# Patient Record
Sex: MALE | Race: WHITE | Employment: OTHER | URBAN - METROPOLITAN AREA
[De-identification: names, ages, dates, MRNs, and addresses within clinical notes are randomized per-mention and may not be internally consistent; named-entity substitution may affect disease eponyms.]

---

## 2017-01-01 ENCOUNTER — HOSPITAL ENCOUNTER (EMERGENCY)
Age: 77
Discharge: HOME | End: 2017-10-10
Attending: EMERGENCY MEDICINE
Payer: MEDICARE

## 2017-01-01 ENCOUNTER — TELEPHONE (OUTPATIENT)
Dept: ONCOLOGY | Age: 77
End: 2017-01-01

## 2017-01-01 ENCOUNTER — OFFICE VISIT (OUTPATIENT)
Dept: FAMILY MEDICINE CLINIC | Age: 77
End: 2017-01-01

## 2017-01-01 ENCOUNTER — HOSPITAL ENCOUNTER (OUTPATIENT)
Dept: CT IMAGING | Age: 77
Discharge: HOME | End: 2017-10-05
Attending: INTERNAL MEDICINE
Payer: MEDICARE

## 2017-01-01 ENCOUNTER — OFFICE VISIT (OUTPATIENT)
Dept: ONCOLOGY | Age: 77
End: 2017-01-01

## 2017-01-01 ENCOUNTER — HOSPITAL ENCOUNTER (OUTPATIENT)
Dept: CT IMAGING | Age: 77
Discharge: HOME | End: 2017-10-17
Attending: INTERNAL MEDICINE
Payer: MEDICARE

## 2017-01-01 ENCOUNTER — HOSPITAL ENCOUNTER (OUTPATIENT)
Age: 77
Setting detail: OUTPATIENT SURGERY
Discharge: HOME | End: 2017-10-27
Attending: INTERNAL MEDICINE | Admitting: INTERNAL MEDICINE
Payer: MEDICARE

## 2017-01-01 ENCOUNTER — TELEPHONE ANTICOAG (OUTPATIENT)
Dept: FAMILY MEDICINE CLINIC | Age: 77
End: 2017-01-01

## 2017-01-01 ENCOUNTER — HOSPITAL ENCOUNTER (OUTPATIENT)
Dept: ULTRASOUND IMAGING | Age: 77
Discharge: HOME | End: 2017-10-05
Attending: RADIOLOGY
Payer: MEDICARE

## 2017-01-01 ENCOUNTER — TELEPHONE (OUTPATIENT)
Dept: FAMILY MEDICINE CLINIC | Age: 77
End: 2017-01-01

## 2017-01-01 ENCOUNTER — ANESTHESIA EVENT (OUTPATIENT)
Dept: ENDOSCOPY | Age: 77
End: 2017-01-01
Payer: MEDICARE

## 2017-01-01 ENCOUNTER — APPOINTMENT (OUTPATIENT)
Dept: GENERAL RADIOLOGY | Age: 77
End: 2017-01-01
Attending: EMERGENCY MEDICINE
Payer: MEDICARE

## 2017-01-01 ENCOUNTER — APPOINTMENT (OUTPATIENT)
Dept: CT IMAGING | Age: 77
End: 2017-01-01
Attending: EMERGENCY MEDICINE
Payer: MEDICARE

## 2017-01-01 ENCOUNTER — HOSPITAL ENCOUNTER (OUTPATIENT)
Dept: MRI IMAGING | Age: 77
Discharge: HOME | End: 2017-10-20
Attending: INTERNAL MEDICINE
Payer: MEDICARE

## 2017-01-01 ENCOUNTER — ANESTHESIA (OUTPATIENT)
Dept: ENDOSCOPY | Age: 77
End: 2017-01-01
Payer: MEDICARE

## 2017-01-01 VITALS
WEIGHT: 168 LBS | HEART RATE: 112 BPM | BODY MASS INDEX: 24.05 KG/M2 | DIASTOLIC BLOOD PRESSURE: 64 MMHG | SYSTOLIC BLOOD PRESSURE: 110 MMHG | RESPIRATION RATE: 20 BRPM | OXYGEN SATURATION: 97 % | HEIGHT: 70 IN

## 2017-01-01 VITALS
WEIGHT: 164.56 LBS | TEMPERATURE: 98.1 F | SYSTOLIC BLOOD PRESSURE: 111 MMHG | HEIGHT: 70 IN | DIASTOLIC BLOOD PRESSURE: 85 MMHG | RESPIRATION RATE: 20 BRPM | BODY MASS INDEX: 23.56 KG/M2 | OXYGEN SATURATION: 98 % | HEART RATE: 91 BPM

## 2017-01-01 VITALS
SYSTOLIC BLOOD PRESSURE: 132 MMHG | OXYGEN SATURATION: 96 % | BODY MASS INDEX: 25.97 KG/M2 | HEART RATE: 74 BPM | RESPIRATION RATE: 16 BRPM | WEIGHT: 181 LBS | DIASTOLIC BLOOD PRESSURE: 84 MMHG

## 2017-01-01 VITALS
DIASTOLIC BLOOD PRESSURE: 90 MMHG | RESPIRATION RATE: 16 BRPM | OXYGEN SATURATION: 97 % | HEART RATE: 54 BPM | SYSTOLIC BLOOD PRESSURE: 154 MMHG | WEIGHT: 183 LBS | BODY MASS INDEX: 26.26 KG/M2

## 2017-01-01 VITALS
SYSTOLIC BLOOD PRESSURE: 168 MMHG | RESPIRATION RATE: 16 BRPM | BODY MASS INDEX: 24.88 KG/M2 | TEMPERATURE: 97.2 F | DIASTOLIC BLOOD PRESSURE: 84 MMHG | HEIGHT: 70 IN | OXYGEN SATURATION: 98 % | HEART RATE: 92 BPM | WEIGHT: 173.8 LBS

## 2017-01-01 VITALS
SYSTOLIC BLOOD PRESSURE: 180 MMHG | DIASTOLIC BLOOD PRESSURE: 97 MMHG | HEART RATE: 108 BPM | TEMPERATURE: 97.3 F | WEIGHT: 171.74 LBS | HEIGHT: 70 IN | RESPIRATION RATE: 25 BRPM | BODY MASS INDEX: 24.59 KG/M2 | OXYGEN SATURATION: 94 %

## 2017-01-01 VITALS
TEMPERATURE: 96 F | OXYGEN SATURATION: 98 % | DIASTOLIC BLOOD PRESSURE: 90 MMHG | RESPIRATION RATE: 17 BRPM | WEIGHT: 179.8 LBS | BODY MASS INDEX: 25.8 KG/M2 | SYSTOLIC BLOOD PRESSURE: 142 MMHG | HEART RATE: 65 BPM

## 2017-01-01 VITALS
DIASTOLIC BLOOD PRESSURE: 82 MMHG | RESPIRATION RATE: 19 BRPM | WEIGHT: 179.4 LBS | OXYGEN SATURATION: 97 % | BODY MASS INDEX: 25.74 KG/M2 | SYSTOLIC BLOOD PRESSURE: 125 MMHG | HEART RATE: 67 BPM

## 2017-01-01 VITALS
HEIGHT: 70 IN | RESPIRATION RATE: 19 BRPM | WEIGHT: 183.6 LBS | OXYGEN SATURATION: 98 % | DIASTOLIC BLOOD PRESSURE: 70 MMHG | SYSTOLIC BLOOD PRESSURE: 118 MMHG | BODY MASS INDEX: 26.28 KG/M2 | HEART RATE: 79 BPM

## 2017-01-01 VITALS
OXYGEN SATURATION: 97 % | DIASTOLIC BLOOD PRESSURE: 90 MMHG | HEART RATE: 93 BPM | RESPIRATION RATE: 16 BRPM | SYSTOLIC BLOOD PRESSURE: 138 MMHG | TEMPERATURE: 98.1 F | WEIGHT: 175 LBS | HEIGHT: 70 IN | BODY MASS INDEX: 25.05 KG/M2

## 2017-01-01 VITALS
HEART RATE: 110 BPM | OXYGEN SATURATION: 95 % | SYSTOLIC BLOOD PRESSURE: 94 MMHG | RESPIRATION RATE: 16 BRPM | BODY MASS INDEX: 24.34 KG/M2 | HEIGHT: 70 IN | WEIGHT: 170 LBS | DIASTOLIC BLOOD PRESSURE: 60 MMHG

## 2017-01-01 VITALS
BODY MASS INDEX: 26.14 KG/M2 | OXYGEN SATURATION: 98 % | HEART RATE: 87 BPM | RESPIRATION RATE: 19 BRPM | WEIGHT: 182.2 LBS

## 2017-01-01 DIAGNOSIS — C78.7 LIVER METASTASIS (HCC): ICD-10-CM

## 2017-01-01 DIAGNOSIS — C78.6 METASTATIC ADENOCARCINOMA INVOLVING RETROPERITONEUM WITH UNKNOWN PRIMARY SITE (HCC): ICD-10-CM

## 2017-01-01 DIAGNOSIS — I45.10 RBBB (RIGHT BUNDLE BRANCH BLOCK): ICD-10-CM

## 2017-01-01 DIAGNOSIS — K44.9 HIATAL HERNIA: ICD-10-CM

## 2017-01-01 DIAGNOSIS — L01.00 IMPETIGO: ICD-10-CM

## 2017-01-01 DIAGNOSIS — C80.1 METASTATIC ADENOCARCINOMA INVOLVING RETROPERITONEUM WITH UNKNOWN PRIMARY SITE (HCC): Primary | ICD-10-CM

## 2017-01-01 DIAGNOSIS — Z71.89 ADVANCE CARE PLANNING: ICD-10-CM

## 2017-01-01 DIAGNOSIS — R10.84 ABDOMINAL PAIN, GENERALIZED: Primary | ICD-10-CM

## 2017-01-01 DIAGNOSIS — K76.9 LIVER LESION: ICD-10-CM

## 2017-01-01 DIAGNOSIS — R10.11 RUQ PAIN: ICD-10-CM

## 2017-01-01 DIAGNOSIS — G44.209 TENSION HEADACHE: ICD-10-CM

## 2017-01-01 DIAGNOSIS — R52 PAIN: ICD-10-CM

## 2017-01-01 DIAGNOSIS — I48.20 CHRONIC ATRIAL FIBRILLATION (HCC): Primary | ICD-10-CM

## 2017-01-01 DIAGNOSIS — M25.552 LEFT HIP PAIN: ICD-10-CM

## 2017-01-01 DIAGNOSIS — R16.0 LIVER MASS: ICD-10-CM

## 2017-01-01 DIAGNOSIS — S81.801A LEG WOUND, RIGHT, INITIAL ENCOUNTER: ICD-10-CM

## 2017-01-01 DIAGNOSIS — C15.5 MALIGNANT NEOPLASM OF LOWER THIRD OF ESOPHAGUS (HCC): ICD-10-CM

## 2017-01-01 DIAGNOSIS — K62.1 RECTAL POLYP: ICD-10-CM

## 2017-01-01 DIAGNOSIS — C78.7 METASTASIS TO LIVER (HCC): Primary | ICD-10-CM

## 2017-01-01 DIAGNOSIS — C80.1 CARCINOMA (HCC): ICD-10-CM

## 2017-01-01 DIAGNOSIS — I10 ESSENTIAL HYPERTENSION: ICD-10-CM

## 2017-01-01 DIAGNOSIS — I48.20 CHRONIC ATRIAL FIBRILLATION (HCC): ICD-10-CM

## 2017-01-01 DIAGNOSIS — C77.9 REGIONAL LYMPH NODE METASTASIS PRESENT (HCC): ICD-10-CM

## 2017-01-01 DIAGNOSIS — C78.7 LIVER METASTASES (HCC): ICD-10-CM

## 2017-01-01 DIAGNOSIS — R21 RASH: ICD-10-CM

## 2017-01-01 DIAGNOSIS — C80.1 METASTATIC ADENOCARCINOMA INVOLVING RETROPERITONEUM WITH UNKNOWN PRIMARY SITE (HCC): ICD-10-CM

## 2017-01-01 DIAGNOSIS — R93.5 ABNORMAL US (ULTRASOUND) OF ABDOMEN: ICD-10-CM

## 2017-01-01 DIAGNOSIS — S81.801A OPEN WOUND OF LEG, RIGHT, INITIAL ENCOUNTER: ICD-10-CM

## 2017-01-01 DIAGNOSIS — I87.2 VENOUS STASIS DERMATITIS OF RIGHT LOWER EXTREMITY: Primary | ICD-10-CM

## 2017-01-01 DIAGNOSIS — C78.7 LIVER METASTASIS (HCC): Primary | ICD-10-CM

## 2017-01-01 DIAGNOSIS — Z00.00 MEDICARE ANNUAL WELLNESS VISIT, SUBSEQUENT: Primary | ICD-10-CM

## 2017-01-01 DIAGNOSIS — Z71.89 ADVANCED CARE PLANNING/COUNSELING DISCUSSION: ICD-10-CM

## 2017-01-01 DIAGNOSIS — C78.6 METASTATIC ADENOCARCINOMA INVOLVING RETROPERITONEUM WITH UNKNOWN PRIMARY SITE (HCC): Primary | ICD-10-CM

## 2017-01-01 LAB
ALBUMIN SERPL-MCNC: 3.1 G/DL (ref 3.5–5)
ALBUMIN SERPL-MCNC: 4.1 G/DL (ref 3.5–4.8)
ALBUMIN/GLOB SERPL: 0.7 {RATIO} (ref 1.1–2.2)
ALBUMIN/GLOB SERPL: 1.6 {RATIO} (ref 1.2–2.2)
ALP SERPL-CCNC: 151 IU/L (ref 39–117)
ALP SERPL-CCNC: 262 U/L (ref 45–117)
ALT SERPL-CCNC: 168 U/L (ref 12–78)
ALT SERPL-CCNC: 85 IU/L (ref 0–44)
ANION GAP SERPL CALC-SCNC: 10 MMOL/L (ref 5–15)
APPEARANCE UR: ABNORMAL
AST SERPL-CCNC: 160 U/L (ref 15–37)
AST SERPL-CCNC: 75 IU/L (ref 0–40)
ATRIAL RATE: 100 BPM
BACTERIA SPEC AEROBE CULT: ABNORMAL
BACTERIA URNS QL MICRO: NEGATIVE /HPF
BASOPHILS # BLD AUTO: 0 X10E3/UL (ref 0–0.2)
BASOPHILS # BLD: 0 K/UL (ref 0–0.1)
BASOPHILS NFR BLD AUTO: 1 %
BASOPHILS NFR BLD: 0 % (ref 0–1)
BILIRUB SERPL-MCNC: 1.2 MG/DL (ref 0–1.2)
BILIRUB SERPL-MCNC: 2 MG/DL (ref 0.2–1)
BILIRUB UR QL CFM: NEGATIVE
BUN SERPL-MCNC: 14 MG/DL (ref 8–27)
BUN SERPL-MCNC: 15 MG/DL (ref 6–20)
BUN/CREAT SERPL: 14 (ref 10–24)
BUN/CREAT SERPL: 17 (ref 12–20)
CALCIUM SERPL-MCNC: 10 MG/DL (ref 8.6–10.2)
CALCIUM SERPL-MCNC: 9.5 MG/DL (ref 8.5–10.1)
CALCULATED R AXIS, ECG10: 74 DEGREES
CALCULATED T AXIS, ECG11: -3 DEGREES
CHLORIDE SERPL-SCNC: 101 MMOL/L (ref 97–108)
CHLORIDE SERPL-SCNC: 98 MMOL/L (ref 96–106)
CHOLEST SERPL-MCNC: 207 MG/DL (ref 100–199)
CK SERPL-CCNC: 258 U/L (ref 39–308)
CO2 SERPL-SCNC: 23 MMOL/L (ref 21–32)
CO2 SERPL-SCNC: 27 MMOL/L (ref 18–29)
COLOR UR: ABNORMAL
CREAT SERPL-MCNC: 0.88 MG/DL (ref 0.7–1.3)
CREAT SERPL-MCNC: 1 MG/DL (ref 0.76–1.27)
DIAGNOSIS, 93000: NORMAL
EOSINOPHIL # BLD AUTO: 0.1 X10E3/UL (ref 0–0.4)
EOSINOPHIL # BLD: 0.1 K/UL (ref 0–0.4)
EOSINOPHIL NFR BLD AUTO: 1 %
EOSINOPHIL NFR BLD: 2 % (ref 0–7)
EPITH CASTS URNS QL MICRO: ABNORMAL /LPF
ERYTHROCYTE [DISTWIDTH] IN BLOOD BY AUTOMATED COUNT: 14.6 % (ref 11.5–14.5)
ERYTHROCYTE [DISTWIDTH] IN BLOOD BY AUTOMATED COUNT: 14.9 % (ref 12.3–15.4)
GLOBULIN SER CALC-MCNC: 2.5 G/DL (ref 1.5–4.5)
GLOBULIN SER CALC-MCNC: 4.2 G/DL (ref 2–4)
GLUCOSE SERPL-MCNC: 102 MG/DL (ref 65–100)
GLUCOSE SERPL-MCNC: 156 MG/DL (ref 65–99)
GLUCOSE UR STRIP.AUTO-MCNC: NEGATIVE MG/DL
HCT VFR BLD AUTO: 45.3 % (ref 36.6–50.3)
HCT VFR BLD AUTO: 47.7 % (ref 37.5–51)
HDLC SERPL-MCNC: 71 MG/DL
HGB BLD-MCNC: 15.8 G/DL (ref 12.1–17)
HGB BLD-MCNC: 15.9 G/DL (ref 12.6–17.7)
HGB UR QL STRIP: NEGATIVE
IMM GRANULOCYTES # BLD: 0 X10E3/UL (ref 0–0.1)
IMM GRANULOCYTES NFR BLD: 1 %
INR BLD: 1 (ref 0.9–1.2)
INR BLD: 2.1
INR BLD: 2.2
INR BLD: 2.2
INR BLD: 2.5
INR BLD: 3.2
INR BLD: 3.5
INR PPP: 1.3 (ref 0.9–1.1)
INR PPP: 1.9 (ref 0.8–1.2)
INR PPP: 2.2 (ref 0.8–1.2)
INR PPP: 2.7 (ref 0.8–1.2)
INR PPP: 3.6 (ref 0.8–1.2)
INR PPP: 4.5 (ref 0.8–1.2)
INR, EXTERNAL: 1.9
INR, EXTERNAL: 2.2
KETONES UR QL STRIP.AUTO: ABNORMAL MG/DL
LACTATE SERPL-SCNC: 1.5 MMOL/L (ref 0.4–2)
LDLC SERPL CALC-MCNC: 118 MG/DL (ref 0–99)
LEUKOCYTE ESTERASE UR QL STRIP.AUTO: NEGATIVE
LIPASE SERPL-CCNC: 221 U/L (ref 73–393)
LYMPHOCYTES # BLD AUTO: 1.3 X10E3/UL (ref 0.7–3.1)
LYMPHOCYTES # BLD: 1.2 K/UL (ref 0.8–3.5)
LYMPHOCYTES NFR BLD AUTO: 20 %
LYMPHOCYTES NFR BLD: 15 % (ref 12–49)
MAGNESIUM SERPL-MCNC: 2 MG/DL (ref 1.6–2.4)
MCH RBC QN AUTO: 31.9 PG (ref 26.6–33)
MCH RBC QN AUTO: 32.2 PG (ref 26–34)
MCHC RBC AUTO-ENTMCNC: 33.3 G/DL (ref 31.5–35.7)
MCHC RBC AUTO-ENTMCNC: 34.9 G/DL (ref 30–36.5)
MCV RBC AUTO: 92.4 FL (ref 80–99)
MCV RBC AUTO: 96 FL (ref 79–97)
MONOCYTES # BLD AUTO: 0.7 X10E3/UL (ref 0.1–0.9)
MONOCYTES # BLD: 1.2 K/UL (ref 0–1)
MONOCYTES NFR BLD AUTO: 11 %
MONOCYTES NFR BLD: 14 % (ref 5–13)
NEUTROPHILS # BLD AUTO: 4.3 X10E3/UL (ref 1.4–7)
NEUTROPHILS NFR BLD AUTO: 66 %
NEUTS SEG # BLD: 5.7 K/UL (ref 1.8–8)
NEUTS SEG NFR BLD: 69 % (ref 32–75)
NITRITE UR QL STRIP.AUTO: NEGATIVE
PH UR STRIP: 5.5 [PH] (ref 5–8)
PLATELET # BLD AUTO: 190 X10E3/UL (ref 150–379)
PLATELET # BLD AUTO: 201 K/UL (ref 150–400)
POTASSIUM SERPL-SCNC: 3.9 MMOL/L (ref 3.5–5.1)
POTASSIUM SERPL-SCNC: 4.6 MMOL/L (ref 3.5–5.2)
PROT SERPL-MCNC: 6.6 G/DL (ref 6–8.5)
PROT SERPL-MCNC: 7.3 G/DL (ref 6.4–8.2)
PROT UR STRIP-MCNC: ABNORMAL MG/DL
PROTHROMBIN TIME: 13 SEC (ref 9–11.1)
PROTHROMBIN TIME: 19.4 SEC (ref 9.1–12)
PROTHROMBIN TIME: 22.2 SEC (ref 9.1–12)
PROTHROMBIN TIME: 27.4 SEC (ref 9.1–12)
PROTHROMBIN TIME: 36 SEC (ref 9.1–12)
PROTHROMBIN TIME: 44.2 SEC (ref 9.1–12)
PT POC: 25.2 SECONDS
PT POC: 26.2 SECONDS
PT POC: 26.4 SECONDS
PT POC: 30.5 SECONDS
PT POC: 38.9 SEC
PT POC: 42.1 SECONDS
Q-T INTERVAL, ECG07: 314 MS
QRS DURATION, ECG06: 134 MS
QTC CALCULATION (BEZET), ECG08: 407 MS
RBC # BLD AUTO: 4.9 M/UL (ref 4.1–5.7)
RBC # BLD AUTO: 4.99 X10E6/UL (ref 4.14–5.8)
RBC #/AREA URNS HPF: ABNORMAL /HPF (ref 0–5)
SODIUM SERPL-SCNC: 134 MMOL/L (ref 136–145)
SODIUM SERPL-SCNC: 139 MMOL/L (ref 134–144)
SP GR UR REFRACTOMETRY: 1.02 (ref 1–1.03)
TRIGL SERPL-MCNC: 92 MG/DL (ref 0–149)
TROPONIN I SERPL-MCNC: <0.04 NG/ML
UROBILINOGEN UR QL STRIP.AUTO: 1 EU/DL (ref 0.2–1)
VALID INTERNAL CONTROL?: YES
VENTRICULAR RATE, ECG03: 101 BPM
VLDLC SERPL CALC-MCNC: 18 MG/DL (ref 5–40)
WBC # BLD AUTO: 6.4 X10E3/UL (ref 3.4–10.8)
WBC # BLD AUTO: 8.3 K/UL (ref 4.1–11.1)
WBC URNS QL MICRO: ABNORMAL /HPF (ref 0–4)

## 2017-01-01 PROCEDURE — 74011250636 HC RX REV CODE- 250/636: Performed by: RADIOLOGY

## 2017-01-01 PROCEDURE — 77030009426 HC FCPS BIOP ENDOSC BSC -B: Performed by: INTERNAL MEDICINE

## 2017-01-01 PROCEDURE — A9577 INJ MULTIHANCE: HCPCS | Performed by: INTERNAL MEDICINE

## 2017-01-01 PROCEDURE — 83690 ASSAY OF LIPASE: CPT | Performed by: EMERGENCY MEDICINE

## 2017-01-01 PROCEDURE — 83605 ASSAY OF LACTIC ACID: CPT | Performed by: EMERGENCY MEDICINE

## 2017-01-01 PROCEDURE — 83735 ASSAY OF MAGNESIUM: CPT | Performed by: EMERGENCY MEDICINE

## 2017-01-01 PROCEDURE — 88173 CYTOPATH EVAL FNA REPORT: CPT | Performed by: RADIOLOGY

## 2017-01-01 PROCEDURE — 76060000031 HC ANESTHESIA FIRST 0.5 HR: Performed by: INTERNAL MEDICINE

## 2017-01-01 PROCEDURE — 96374 THER/PROPH/DIAG INJ IV PUSH: CPT

## 2017-01-01 PROCEDURE — 88305 TISSUE EXAM BY PATHOLOGIST: CPT | Performed by: INTERNAL MEDICINE

## 2017-01-01 PROCEDURE — 84484 ASSAY OF TROPONIN QUANT: CPT | Performed by: EMERGENCY MEDICINE

## 2017-01-01 PROCEDURE — 74011636320 HC RX REV CODE- 636/320: Performed by: EMERGENCY MEDICINE

## 2017-01-01 PROCEDURE — 36415 COLL VENOUS BLD VENIPUNCTURE: CPT | Performed by: EMERGENCY MEDICINE

## 2017-01-01 PROCEDURE — 85610 PROTHROMBIN TIME: CPT

## 2017-01-01 PROCEDURE — 74011000250 HC RX REV CODE- 250

## 2017-01-01 PROCEDURE — 80053 COMPREHEN METABOLIC PANEL: CPT | Performed by: EMERGENCY MEDICINE

## 2017-01-01 PROCEDURE — 74011250636 HC RX REV CODE- 250/636: Performed by: EMERGENCY MEDICINE

## 2017-01-01 PROCEDURE — 85610 PROTHROMBIN TIME: CPT | Performed by: EMERGENCY MEDICINE

## 2017-01-01 PROCEDURE — 96361 HYDRATE IV INFUSION ADD-ON: CPT

## 2017-01-01 PROCEDURE — 81001 URINALYSIS AUTO W/SCOPE: CPT | Performed by: EMERGENCY MEDICINE

## 2017-01-01 PROCEDURE — 76040000019: Performed by: INTERNAL MEDICINE

## 2017-01-01 PROCEDURE — 88342 IMHCHEM/IMCYTCHM 1ST ANTB: CPT | Performed by: RADIOLOGY

## 2017-01-01 PROCEDURE — 74011250637 HC RX REV CODE- 250/637: Performed by: EMERGENCY MEDICINE

## 2017-01-01 PROCEDURE — 71260 CT THORAX DX C+: CPT

## 2017-01-01 PROCEDURE — 74177 CT ABD & PELVIS W/CONTRAST: CPT

## 2017-01-01 PROCEDURE — 85025 COMPLETE CBC W/AUTO DIFF WBC: CPT | Performed by: EMERGENCY MEDICINE

## 2017-01-01 PROCEDURE — 74011250636 HC RX REV CODE- 250/636

## 2017-01-01 PROCEDURE — 71010 XR CHEST PORT: CPT

## 2017-01-01 PROCEDURE — 82550 ASSAY OF CK (CPK): CPT | Performed by: EMERGENCY MEDICINE

## 2017-01-01 PROCEDURE — 74183 MRI ABD W/O CNTR FLWD CNTR: CPT

## 2017-01-01 PROCEDURE — 74011250636 HC RX REV CODE- 250/636: Performed by: INTERNAL MEDICINE

## 2017-01-01 PROCEDURE — 74011000250 HC RX REV CODE- 250: Performed by: RADIOLOGY

## 2017-01-01 PROCEDURE — 99285 EMERGENCY DEPT VISIT HI MDM: CPT

## 2017-01-01 PROCEDURE — 77030003503 HC NDL BIOP TISS BD -B

## 2017-01-01 PROCEDURE — 47000 NEEDLE BIOPSY OF LIVER PERQ: CPT

## 2017-01-01 PROCEDURE — 74011000258 HC RX REV CODE- 258: Performed by: INTERNAL MEDICINE

## 2017-01-01 PROCEDURE — 93005 ELECTROCARDIOGRAM TRACING: CPT

## 2017-01-01 PROCEDURE — 88365 INSITU HYBRIDIZATION (FISH): CPT | Performed by: RADIOLOGY

## 2017-01-01 PROCEDURE — 74011636320 HC RX REV CODE- 636/320: Performed by: INTERNAL MEDICINE

## 2017-01-01 PROCEDURE — 88341 IMHCHEM/IMCYTCHM EA ADD ANTB: CPT | Performed by: RADIOLOGY

## 2017-01-01 PROCEDURE — 88307 TISSUE EXAM BY PATHOLOGIST: CPT | Performed by: RADIOLOGY

## 2017-01-01 PROCEDURE — 88172 CYTP DX EVAL FNA 1ST EA SITE: CPT | Performed by: RADIOLOGY

## 2017-01-01 RX ORDER — POLYETHYLENE GLYCOL 3350 17 G/17G
17 POWDER, FOR SOLUTION ORAL DAILY
Qty: 30 PACKET | Refills: 3 | Status: SHIPPED | OUTPATIENT
Start: 2017-01-01

## 2017-01-01 RX ORDER — EPINEPHRINE 0.1 MG/ML
1 INJECTION INTRACARDIAC; INTRAVENOUS
Status: DISCONTINUED | OUTPATIENT
Start: 2017-01-01 | End: 2017-01-01 | Stop reason: HOSPADM

## 2017-01-01 RX ORDER — LIDOCAINE HYDROCHLORIDE 10 MG/ML
10 INJECTION, SOLUTION EPIDURAL; INFILTRATION; INTRACAUDAL; PERINEURAL
Status: COMPLETED | OUTPATIENT
Start: 2017-01-01 | End: 2017-01-01

## 2017-01-01 RX ORDER — SODIUM CHLORIDE 9 MG/ML
100 INJECTION, SOLUTION INTRAVENOUS CONTINUOUS
Status: DISCONTINUED | OUTPATIENT
Start: 2017-01-01 | End: 2017-01-01 | Stop reason: HOSPADM

## 2017-01-01 RX ORDER — SODIUM CHLORIDE 9 MG/ML
INJECTION, SOLUTION INTRAVENOUS
Status: DISCONTINUED | OUTPATIENT
Start: 2017-01-01 | End: 2017-01-01 | Stop reason: HOSPADM

## 2017-01-01 RX ORDER — FENTANYL CITRATE 50 UG/ML
100 INJECTION, SOLUTION INTRAMUSCULAR; INTRAVENOUS
Status: DISCONTINUED | OUTPATIENT
Start: 2017-01-01 | End: 2017-01-01 | Stop reason: HOSPADM

## 2017-01-01 RX ORDER — DIPHENHYDRAMINE HYDROCHLORIDE 50 MG/ML
50 INJECTION, SOLUTION INTRAMUSCULAR; INTRAVENOUS ONCE
Status: DISCONTINUED | OUTPATIENT
Start: 2017-01-01 | End: 2017-01-01 | Stop reason: HOSPADM

## 2017-01-01 RX ORDER — MIDAZOLAM HYDROCHLORIDE 1 MG/ML
.25-1 INJECTION, SOLUTION INTRAMUSCULAR; INTRAVENOUS
Status: DISCONTINUED | OUTPATIENT
Start: 2017-01-01 | End: 2017-01-01 | Stop reason: HOSPADM

## 2017-01-01 RX ORDER — LABETALOL HYDROCHLORIDE 5 MG/ML
20 INJECTION, SOLUTION INTRAVENOUS ONCE
Status: COMPLETED | OUTPATIENT
Start: 2017-01-01 | End: 2017-01-01

## 2017-01-01 RX ORDER — ATROPINE SULFATE 0.1 MG/ML
0.5 INJECTION INTRAVENOUS
Status: DISCONTINUED | OUTPATIENT
Start: 2017-01-01 | End: 2017-01-01 | Stop reason: HOSPADM

## 2017-01-01 RX ORDER — FENTANYL CITRATE 50 UG/ML
100 INJECTION, SOLUTION INTRAMUSCULAR; INTRAVENOUS
Status: DISCONTINUED | OUTPATIENT
Start: 2017-01-01 | End: 2017-01-01

## 2017-01-01 RX ORDER — HYDROCODONE BITARTRATE AND ACETAMINOPHEN 5; 325 MG/1; MG/1
1 TABLET ORAL
Status: COMPLETED | OUTPATIENT
Start: 2017-01-01 | End: 2017-01-01

## 2017-01-01 RX ORDER — CEPHALEXIN 500 MG/1
CAPSULE ORAL
COMMUNITY
Start: 2017-01-01 | End: 2017-01-01 | Stop reason: ALTCHOICE

## 2017-01-01 RX ORDER — ONDANSETRON 2 MG/ML
4 INJECTION INTRAMUSCULAR; INTRAVENOUS
Status: COMPLETED | OUTPATIENT
Start: 2017-01-01 | End: 2017-01-01

## 2017-01-01 RX ORDER — SODIUM CHLORIDE 9 MG/ML
50 INJECTION, SOLUTION INTRAVENOUS
Status: COMPLETED | OUTPATIENT
Start: 2017-01-01 | End: 2017-01-01

## 2017-01-01 RX ORDER — SODIUM CHLORIDE 0.9 % (FLUSH) 0.9 %
5-10 SYRINGE (ML) INJECTION AS NEEDED
Status: DISCONTINUED | OUTPATIENT
Start: 2017-01-01 | End: 2017-01-01 | Stop reason: HOSPADM

## 2017-01-01 RX ORDER — CEPHALEXIN 250 MG/1
CAPSULE ORAL
COMMUNITY
Start: 2017-01-01 | End: 2017-01-01 | Stop reason: ALTCHOICE

## 2017-01-01 RX ORDER — HYDROCODONE BITARTRATE AND ACETAMINOPHEN 5; 325 MG/1; MG/1
1 TABLET ORAL
Qty: 20 TAB | Refills: 0 | Status: SHIPPED | OUTPATIENT
Start: 2017-01-01 | End: 2017-01-01 | Stop reason: ALTCHOICE

## 2017-01-01 RX ORDER — SODIUM CHLORIDE 0.9 % (FLUSH) 0.9 %
10 SYRINGE (ML) INJECTION
Status: COMPLETED | OUTPATIENT
Start: 2017-01-01 | End: 2017-01-01

## 2017-01-01 RX ORDER — AMOXICILLIN 250 MG
2 CAPSULE ORAL 2 TIMES DAILY
Qty: 60 TAB | Refills: 3 | Status: SHIPPED | OUTPATIENT
Start: 2017-01-01

## 2017-01-01 RX ORDER — TRAMADOL HYDROCHLORIDE 50 MG/1
50 TABLET ORAL
Qty: 90 TAB | Refills: 0 | Status: SHIPPED | OUTPATIENT
Start: 2017-01-01

## 2017-01-01 RX ORDER — PROCHLORPERAZINE MALEATE 10 MG
10 TABLET ORAL
Qty: 30 TAB | Refills: 5 | Status: SHIPPED | OUTPATIENT
Start: 2017-01-01 | End: 2017-01-01

## 2017-01-01 RX ORDER — DIGOXIN 250 MCG
TABLET ORAL
COMMUNITY
Start: 2017-01-01 | End: 2017-01-01 | Stop reason: ALTCHOICE

## 2017-01-01 RX ORDER — NALOXONE HYDROCHLORIDE 0.4 MG/ML
0.4 INJECTION, SOLUTION INTRAMUSCULAR; INTRAVENOUS; SUBCUTANEOUS
Status: DISCONTINUED | OUTPATIENT
Start: 2017-01-01 | End: 2017-01-01 | Stop reason: HOSPADM

## 2017-01-01 RX ORDER — SODIUM CHLORIDE 0.9 % (FLUSH) 0.9 %
5-10 SYRINGE (ML) INJECTION EVERY 8 HOURS
Status: DISCONTINUED | OUTPATIENT
Start: 2017-01-01 | End: 2017-01-01 | Stop reason: HOSPADM

## 2017-01-01 RX ORDER — LIDOCAINE HYDROCHLORIDE 20 MG/ML
INJECTION, SOLUTION EPIDURAL; INFILTRATION; INTRACAUDAL; PERINEURAL AS NEEDED
Status: DISCONTINUED | OUTPATIENT
Start: 2017-01-01 | End: 2017-01-01 | Stop reason: HOSPADM

## 2017-01-01 RX ORDER — SODIUM CHLORIDE 9 MG/ML
INJECTION INTRAMUSCULAR; INTRAVENOUS; SUBCUTANEOUS
Status: DISCONTINUED
Start: 2017-01-01 | End: 2017-01-01

## 2017-01-01 RX ORDER — MIDAZOLAM HYDROCHLORIDE 1 MG/ML
5 INJECTION, SOLUTION INTRAMUSCULAR; INTRAVENOUS
Status: DISCONTINUED | OUTPATIENT
Start: 2017-01-01 | End: 2017-01-01

## 2017-01-01 RX ORDER — DEXTROMETHORPHAN/PSEUDOEPHED 2.5-7.5/.8
1.2 DROPS ORAL
Status: DISCONTINUED | OUTPATIENT
Start: 2017-01-01 | End: 2017-01-01 | Stop reason: HOSPADM

## 2017-01-01 RX ORDER — CLOTRIMAZOLE AND BETAMETHASONE DIPROPIONATE 10; .64 MG/G; MG/G
CREAM TOPICAL
Qty: 15 G | Refills: 0 | Status: SHIPPED | OUTPATIENT
Start: 2017-01-01 | End: 2017-01-01

## 2017-01-01 RX ORDER — MORPHINE SULFATE 2 MG/ML
4 INJECTION, SOLUTION INTRAMUSCULAR; INTRAVENOUS
Status: DISCONTINUED | OUTPATIENT
Start: 2017-01-01 | End: 2017-01-01 | Stop reason: HOSPADM

## 2017-01-01 RX ORDER — POLYETHYLENE GLYCOL 3350 17 G/17G
POWDER, FOR SOLUTION ORAL
COMMUNITY
Start: 2017-01-01

## 2017-01-01 RX ORDER — PROPOFOL 10 MG/ML
INJECTION, EMULSION INTRAVENOUS AS NEEDED
Status: DISCONTINUED | OUTPATIENT
Start: 2017-01-01 | End: 2017-01-01 | Stop reason: HOSPADM

## 2017-01-01 RX ORDER — FLUMAZENIL 0.1 MG/ML
0.2 INJECTION INTRAVENOUS
Status: DISCONTINUED | OUTPATIENT
Start: 2017-01-01 | End: 2017-01-01 | Stop reason: HOSPADM

## 2017-01-01 RX ORDER — SODIUM CHLORIDE 9 MG/ML
25 INJECTION, SOLUTION INTRAVENOUS CONTINUOUS
Status: DISCONTINUED | OUTPATIENT
Start: 2017-01-01 | End: 2017-01-01

## 2017-01-01 RX ORDER — HYDROCODONE BITARTRATE AND ACETAMINOPHEN 5; 325 MG/1; MG/1
1 TABLET ORAL
Qty: 20 TAB | Refills: 0 | Status: SHIPPED | OUTPATIENT
Start: 2017-01-01 | End: 2017-01-01 | Stop reason: SDUPTHER

## 2017-01-01 RX ADMIN — PROPOFOL 30 MG: 10 INJECTION, EMULSION INTRAVENOUS at 15:29

## 2017-01-01 RX ADMIN — IOPAMIDOL 100 ML: 755 INJECTION, SOLUTION INTRAVENOUS at 12:10

## 2017-01-01 RX ADMIN — Medication 10 ML: at 17:36

## 2017-01-01 RX ADMIN — MIDAZOLAM HYDROCHLORIDE 1 MG: 1 INJECTION INTRAMUSCULAR; INTRAVENOUS at 10:55

## 2017-01-01 RX ADMIN — SODIUM CHLORIDE 50 ML/HR: 900 INJECTION, SOLUTION INTRAVENOUS at 12:10

## 2017-01-01 RX ADMIN — FENTANYL CITRATE 25 MCG: 50 INJECTION, SOLUTION INTRAMUSCULAR; INTRAVENOUS at 10:58

## 2017-01-01 RX ADMIN — SODIUM CHLORIDE 100 ML: 900 INJECTION, SOLUTION INTRAVENOUS at 17:36

## 2017-01-01 RX ADMIN — HYDROCODONE BITARTRATE AND ACETAMINOPHEN 1 TABLET: 5; 325 TABLET ORAL at 13:52

## 2017-01-01 RX ADMIN — SODIUM CHLORIDE 25 ML/HR: 900 INJECTION, SOLUTION INTRAVENOUS at 10:45

## 2017-01-01 RX ADMIN — ONDANSETRON 4 MG: 2 INJECTION INTRAMUSCULAR; INTRAVENOUS at 11:20

## 2017-01-01 RX ADMIN — PROPOFOL 20 MG: 10 INJECTION, EMULSION INTRAVENOUS at 15:30

## 2017-01-01 RX ADMIN — LABETALOL HYDROCHLORIDE 20 MG: 5 INJECTION, SOLUTION INTRAVENOUS at 10:36

## 2017-01-01 RX ADMIN — FENTANYL CITRATE 25 MCG: 50 INJECTION, SOLUTION INTRAMUSCULAR; INTRAVENOUS at 11:03

## 2017-01-01 RX ADMIN — SODIUM CHLORIDE 50 ML/HR: 900 INJECTION, SOLUTION INTRAVENOUS at 08:02

## 2017-01-01 RX ADMIN — LIDOCAINE HYDROCHLORIDE 100 MG: 20 INJECTION, SOLUTION EPIDURAL; INFILTRATION; INTRACAUDAL; PERINEURAL at 15:25

## 2017-01-01 RX ADMIN — LIDOCAINE HYDROCHLORIDE 10 ML: 10 INJECTION, SOLUTION EPIDURAL; INFILTRATION; INTRACAUDAL; PERINEURAL at 12:00

## 2017-01-01 RX ADMIN — IOPAMIDOL 100 ML: 612 INJECTION, SOLUTION INTRAVENOUS at 08:02

## 2017-01-01 RX ADMIN — FENTANYL CITRATE 25 MCG: 50 INJECTION, SOLUTION INTRAMUSCULAR; INTRAVENOUS at 11:13

## 2017-01-01 RX ADMIN — GADOBENATE DIMEGLUMINE 15 ML: 529 INJECTION, SOLUTION INTRAVENOUS at 17:35

## 2017-01-01 RX ADMIN — Medication 10 ML: at 12:10

## 2017-01-01 RX ADMIN — SODIUM CHLORIDE: 9 INJECTION, SOLUTION INTRAVENOUS at 15:07

## 2017-01-01 RX ADMIN — SODIUM CHLORIDE 1000 ML: 900 INJECTION, SOLUTION INTRAVENOUS at 11:21

## 2017-01-01 RX ADMIN — FENTANYL CITRATE 25 MCG: 50 INJECTION, SOLUTION INTRAMUSCULAR; INTRAVENOUS at 10:51

## 2017-01-01 RX ADMIN — PROPOFOL 20 MG: 10 INJECTION, EMULSION INTRAVENOUS at 15:33

## 2017-01-01 RX ADMIN — MIDAZOLAM HYDROCHLORIDE 1 MG: 1 INJECTION INTRAMUSCULAR; INTRAVENOUS at 10:51

## 2017-01-01 RX ADMIN — MIDAZOLAM HYDROCHLORIDE 1 MG: 1 INJECTION INTRAMUSCULAR; INTRAVENOUS at 10:59

## 2017-01-01 RX ADMIN — Medication 10 ML: at 08:02

## 2017-01-01 RX ADMIN — PROPOFOL 100 MG: 10 INJECTION, EMULSION INTRAVENOUS at 15:28

## 2017-02-21 NOTE — PROGRESS NOTES
Patient notified by phone of lab results. He will hold  His warfarin dose tonight. We discussed possible variables which may be altering his metabolism of warfarin. We will hold the dose every Sunday and repeat the INR in 2 weeks.     Niesha Wilhelm

## 2017-03-09 NOTE — TELEPHONE ENCOUNTER
Spoke with patient, instructed to have INR repeated in 2 weeks. They are still in [facility].

## 2017-04-19 NOTE — TELEPHONE ENCOUNTER
Valeria Xin called and would like to get a follow up apt/blood work for her  sometime next week. Please let me know if/where we can fit him in. She said June is too late to wait. Thank you.

## 2017-05-03 NOTE — MR AVS SNAPSHOT
Visit Information Date & Time Provider Department Dept. Phone Encounter #  
 5/3/2017  1:30 PM Mariama Chauhan MD [address] [place] [address] [id number] Your Appointments 7/5/2017 10:30 AM  
ESTABLISHED PATIENT with MD Cecy Mckinnon 38 ([facility]) Appt Note: 2 MO F/U  
 1000 [facility] [address] [facility],5[address] [address] [phone]  
  
   
 1000 [address] [address] Upcoming Health Maintenance Date Due  
 GLAUCOMA SCREENING Q2Y 11/21/2003 DTaP/Tdap/Td series (1 - Tdap) 5/23/2013 MEDICARE YEARLY EXAM 4/29/2017 INFLUENZA AGE 9 TO ADULT 8/1/2017 Allergies as of 5/3/2017  Review Complete On: 12/28/2016 By: Mariama Chauhan MD  
  
 Severity Noted Reaction Type Reactions Lactose  08/18/2015    Other (comments) Intolerance? Problems after ice cream, some cheese Pradaxa [Dabigatran Etexilate]  07/22/2015    Other (comments) Affected vision Current Immunizations  Never Reviewed Name Date Influenza High Dose Vaccine PF 10/21/2016 Influenza Vaccine 10/20/2015, 10/1/2014 Pneumococcal Conjugate (PCV-13) 7/22/2015 10:18 AM  
 Pneumococcal Vaccine (Unspecified Type) 7/22/2009 Td 5/22/2013 Varicella Virus Vaccine 7/22/2006 Not reviewed this visit You Were Diagnosed With   
  
 Codes Comments Chronic atrial fibrillation (HCC)    -  Primary ICD-10-CM: G28.8 ICD-9-CM: 427.31 Medicare annual wellness visit, subsequent     ICD-10-CM: Z00.00 ICD-9-CM: V70.0 Vitals BP Pulse Resp Height(growth percentile) Weight(growth percentile) SpO2  
 118/70 (BP 1 Location: Left arm, BP Patient Position: Sitting) 79 19 5' 10\" (1.778 m) 183 lb 9.6 oz (83.3 kg) 98% BMI Smoking Status 26.34 kg/m2 Former Smoker Vitals History BMI and BSA Data Body Mass Index Body Surface Area  
 26.34 kg/m 2 2.03 m 2 Preferred Pharmacy Pharmacy Name Phone [facility] - Kwesi Diaz [phone] Your Updated Medication List  
  
   
This list is accurate as of: 5/3/17  2:14 PM.  Always use your most recent med list.  
  
  
  
  
 ammonium lactate 12 % topical cream  
Commonly known as:  AMLACTIN Apply  to affected area two (2) times a day. rub in to affected area well B COMPLEX 100 PO Take  by mouth daily. CARDURA 8 mg tablet Generic drug:  doxazosin Take 1 Tab by mouth nightly. clotrimazole-betamethasone topical cream  
Commonly known as:  Josue Manger Apply  to affected area two (2) times a day. digoxin 0.25 mg tablet Commonly known as:  LANOXIN Take 1 Tab by mouth daily. fluorouracil 5 % chemo cream  
Commonly known as:  EFUDEX Glucosamine &Chondroit-MV-Min3 660-195-69-0.5 mg Tab Take  by mouth daily. metoprolol succinate 100 mg tablet Commonly known as:  TOPROL-XL  
TAKE ONE TABLET BY MOUTH 2 TIMES A DAY  
  
 omeprazole 20 mg capsule Commonly known as:  PRILOSEC  
TAKE 1 CAPSULE NIGHTLY  
  
 RAPAFLO 8 mg capsule Generic drug:  silodosin Take 8 mg by mouth daily (with breakfast). VITAMIN D3 1,000 unit Cap Generic drug:  cholecalciferol Take  by mouth daily. warfarin 5 mg tablet Commonly known as:  COUMADIN Take 1 Tab by mouth nightly. Indications: CEREBRAL THROMBOEMBOLISM PREVENTION We Performed the Following AMB POC PT/INR [20863 CPT(R)] COLLECTION CAPILLARY BLOOD SPECIMEN [92685 CPT(R)] Introducing [facility] & HEALTH SERVICES! Shikha Sawant introduces [employer] patient portal. Now you can access parts of your medical record, email your doctor's office, and request medication refills online. 1. In your internet browser, go to [email or web address] 2. Click on the First Time User? Click Here link in the Sign In box. You will see the New Member Sign Up page. 3. Enter your [employer] Access Code exactly as it appears below. You will not need to use this code after youve completed the sign-up process. If you do not sign up before the expiration date, you must request a new code. · [employer] Access Code: [email or web address] Expires: 8/1/2017  2:14 PM 
 
4. Enter the last four digits of your Social Security Number (xxxx) and Date of Birth (mm/dd/yyyy) as indicated and click Submit. You will be taken to the next sign-up page. 5. Create a [employer] ID. This will be your [employer] login ID and cannot be changed, so think of one that is secure and easy to remember. 6. Create a [employer] password. You can change your password at any time. 7. Enter your Password Reset Question and Answer. This can be used at a later time if you forget your password. 8. Enter your e-mail address. You will receive e-mail notification when new information is available in [address] 9. Click Sign Up. You can now view and download portions of your medical record. 10. Click the Download Summary menu link to download a portable copy of your medical information. If you have questions, please visit the Frequently Asked Questions section of the [employer] website. Remember, [employer] is NOT to be used for urgent needs. For medical emergencies, dial 911. Now available from your iPhone and Android! Please provide this summary of care documentation to your next provider. Your primary care clinician is listed as Syeda Diaz. If you have any questions after today's visit, please call [phone].

## 2017-05-03 NOTE — ACP (ADVANCE CARE PLANNING)
Mono has a Living Will on file. In the event that he is unable to speak for himself, he designates his wife, Lizbeth Bishop, do speak on his behalf and she can be reached at [phone].     Adriane Self

## 2017-05-03 NOTE — PROGRESS NOTES
Chief Complaint   Patient presents with    Annual Wellness Visit    Anticoagulation     INR Check up          HPI:      Christy Belle is a 64 y.o. male. 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 He has recovered nicely from an acute Cervical Disc herniation last year. He has resumed golf. He has Atrial Fibrillation and is anticoagulated with Warfarin. Metoprolol has worked well for rate control. JOE has had a prostatectomy for prostate cancer. Left hip pain has improved after PT. He performs exercises daily. Has resumed golf. Due for SAWV       Allergies   Allergen Reactions    Lactose Other (comments)     Intolerance? Problems after ice cream, some cheese    Pradaxa [Dabigatran Etexilate] Other (comments)     Affected vision       Current Outpatient Prescriptions   Medication Sig    fluorouracil (EFUDEX) 5 % chemo cream     ammonium lactate (AMLACTIN) 12 % topical cream Apply  to affected area two (2) times a day. rub in to affected area well    omeprazole (PRILOSEC) 20 mg capsule TAKE 1 CAPSULE NIGHTLY    CARDURA 8 mg tablet Take 1 Tab by mouth nightly.  warfarin (COUMADIN) 5 mg tablet Take 1 Tab by mouth nightly. Indications: CEREBRAL THROMBOEMBOLISM PREVENTION    digoxin (LANOXIN) 0.25 mg tablet Take 1 Tab by mouth daily.  clotrimazole-betamethasone (LOTRISONE) topical cream Apply  to affected area two (2) times a day.  silodosin (RAPAFLO) 8 mg capsule Take 8 mg by mouth daily (with breakfast).  Glucosamine &Chondroit-MV-Min3 976-100-86-0.5 mg tab Take  by mouth daily.  VITAMIN B COMPLEX/FOLIC ACID (B COMPLEX 748 PO) Take  by mouth daily.  Cholecalciferol, Vitamin D3, (VITAMIN D3) 1,000 unit cap Take  by mouth daily.  metoprolol succinate (TOPROL-XL) 100 mg XL tablet TAKE ONE TABLET BY MOUTH 2 TIMES A DAY     No current facility-administered medications for this visit.         Past Medical History:   Diagnosis Date    Arthritis     Atrial fibrillation ([facility] [address] 1994    Cancer ([facility] [address] 2008    SKIN X5, squamous    Cervical disc disease     GERD (gastroesophageal reflux disease)     History of prostate biopsy 2006, 2010    Dr Christy Gee Hypertension     Presbycusis     RBBB (right bundle branch block)     TIA (transient ischemic attack) 1994         ROS:  Denies fever, chills, cough, chest pain, SOB,  nausea, vomiting, or diarrhea. Denies wt loss, wt gain, hemoptysis, hematochezia or melena. Physical Examination:    /70 (BP 1 Location: Left arm, BP Patient Position: Sitting)  Pulse 79  Resp 19  Ht 5' 10\" (1.778 m)  Wt 183 lb 9.6 oz (83.3 kg)  SpO2 98%  BMI 26.34 kg/m2    General: Alert and Ox3, Fluent speech  HEENT:  NC/AT, EOMI, OP: clear  Neck:  Supple, no adenopathy, JVD, mass or bruit  Chest:  Clear to Ausculation, without wheezes, rales, rubs or ronchi  Cardiac: IRRR  Abdomen:  +BS, soft, nontender without palpable HSM  Extremities:  No cyanosis, clubbing or edema  Neurologic:  Ambulatory without assist, CN 2-12 grossly intact. Moves all extremities. Skin: no rash  Lymphadenopathy: no cervical or supraclavicular nodes      ASSESSMENT AND PLAN:     1.  AF:  Therapeutically anticoagulated. 2.  OA, Left HIp:  Continue PT  3. SAWV today. Orders Placed This Encounter   Nain Lantigua POC PT/INR       Tank Krishna MD, FACP        ______________________________________________________________________    Claude Dublin is a 64 y.o. male and presents for annual Medicare Wellness Visit. Problem List: Reviewed with patient and discussed risk factors.     Patient Active Problem List   Diagnosis Code    Atrial fibrillation (HCC) I48.91    RBBB (right bundle branch block) I45.10    Hypertension I10    GERD (gastroesophageal reflux disease) K21.9    Advanced care planning/counseling discussion Z71.89    History of prostate biopsy Z98.890       Current medical providers:  Patient Care Team:  Nolvia Hou MD as PCP - General (Internal Medicine)  Bishop Luzma MD (Neurosurgery)  Bishop Luzma MD (Neurosurgery)  Bc Ribera MD (Internal Medicine)    [facility], Medications/Allergies: reviewed, on chart. Male Alcohol Screening: On any occasion during the past 3 months, have you had more than 4 drinks containing alcohol? No    Do you average more than 14 drinks per week? No    ROS:  Constitutional: No fever, chills or weight loss  Respiratory: No cough, SOB   CV: No chest pain or Palpitations    Objective:  Visit Vitals    /70 (BP 1 Location: Left arm, BP Patient Position: Sitting)    Pulse 79    Resp 19    Ht 5' 10\" (1.778 m)    Wt 183 lb 9.6 oz (83.3 kg)    SpO2 98%    BMI 26.34 kg/m2    Body mass index is 26.34 kg/(m^2). Assessment of cognitive impairment: Alert and oriented x 3    Depression Screen:   PHQ over the last two weeks 5/3/2017   Little interest or pleasure in doing things Not at all   Feeling down, depressed or hopeless Not at all   Total Score PHQ 2 0       Fall Risk Assessment:    Fall Risk Assessment, last 12 mths 5/3/2017   Able to walk? Yes   Fall in past 12 months? No       Functional Ability:   Does the patient exhibit a steady gait? yes   How long did it take the patient to get up and walk from a sitting position? 12 sec   Is the patient self reliant?  (ie can do own laundry, meals, household chores)  yes     Does the patient handle his/her own medications? yes     Does the patient handle his/her own money? yes     Is the patients home safe (ie good lighting, handrails on stairs and bath, etc.)? yes     Did you notice or did patient express any hearing difficulties? yes     Did you notice or did patient express any vision difficulties? no       Advance Care Planning:   Patient was offered the opportunity to discuss advance care planning:  yes     Does patient have an Advance Directive:  yes   If no, did you provide information on Caring Connections?   no Plan:      Orders Placed This Encounter    COLLECTION CAPILLARY BLOOD SPECIMEN    AMB POC PT/INR       Health Maintenance   Topic Date Due    GLAUCOMA SCREENING Q2Y  11/21/2003    DTaP/Tdap/Td series (1 - Tdap) 05/23/2013    MEDICARE YEARLY EXAM  04/29/2017    INFLUENZA AGE 9 TO ADULT  08/01/2017    ZOSTER VACCINE AGE 60>  Completed    Pneumococcal 65+ Low/Medium Risk  Completed       *Patient verbalized understanding and agreement with the plan. A copy of the After Visit Summary with personalized health plan was given to the patient today.

## 2017-05-31 NOTE — TELEPHONE ENCOUNTER
PT NEEDS APPOINTMENT FOR THIS WEEK. PT HAS BACK PAIN ALSO REQUESTING PHYSICAL THERAPY FOR BACK. ONLY Z'S LEFT.  PT  CAN BE REACHED -[phone]

## 2017-06-02 NOTE — PROGRESS NOTES
Results for orders placed or performed in visit on 06/02/17   AMB POC PT/INR   Result Value Ref Range    VALID INTERNAL CONTROL POC Yes     Prothrombin time (POC) 42.1 seconds    INR POC 3.5

## 2017-06-02 NOTE — MR AVS SNAPSHOT
Visit Information Date & Time Provider Department Dept. Phone Encounter #  
 6/2/2017  3:00 PM Carolina Garcia MD [address] [place] [address] [id number] Your Appointments 7/5/2017 10:30 AM  
ESTABLISHED PATIENT with MD Bc Anandkikagen 38 ([facility] Appt Note: 2 MO F/U  
 1000 [facility] [address] [facility],5[address] [address] [phone]  
  
   
 1000 [address] [address] Upcoming Health Maintenance Date Due  
 GLAUCOMA SCREENING Q2Y 11/21/2003 DTaP/Tdap/Td series (1 - Tdap) 5/23/2013 INFLUENZA AGE 9 TO ADULT 8/1/2017 MEDICARE YEARLY EXAM 5/4/2018 Allergies as of 6/2/2017  Review Complete On: 6/2/2017 By: Carolina Garcia MD  
  
 Severity Noted Reaction Type Reactions Lactose  08/18/2015    Other (comments) Intolerance? Problems after ice cream, some cheese Pradaxa [Dabigatran Etexilate]  07/22/2015    Other (comments) Affected vision Current Immunizations  Never Reviewed Name Date Influenza High Dose Vaccine PF 10/21/2016 Influenza Vaccine 10/20/2015, 10/1/2014 Pneumococcal Conjugate (PCV-13) 7/22/2015 10:18 AM  
 Pneumococcal Vaccine (Unspecified Type) 7/22/2009 Td 5/22/2013 Varicella Virus Vaccine 7/22/2006 Not reviewed this visit You Were Diagnosed With   
  
 Codes Comments Chronic atrial fibrillation (HCC)    -  Primary ICD-10-CM: C00.3 ICD-9-CM: 427.31 Left hip pain     ICD-10-CM: M25.552 ICD-9-CM: 719.45 Vitals BP Pulse Resp Weight(growth percentile) SpO2 BMI  
 154/90 (BP 1 Location: Left arm, BP Patient Position: Sitting) (!) 54 16 183 lb (83 kg) 97% 26.26 kg/m2 Smoking Status Former Smoker BMI and BSA Data Body Mass Index Body Surface Area  
 26.26 kg/m 2 2.02 m 2 Preferred Pharmacy Pharmacy Name Phone [facility] - [facility] [address] [phone] Your Updated Medication List  
  
   
This list is accurate as of: 6/2/17  4:06 PM.  Always use your most recent med list.  
  
  
  
  
 ammonium lactate 12 % topical cream  
Commonly known as:  AMLACTIN Apply  to affected area two (2) times a day. rub in to affected area well B COMPLEX 100 PO Take  by mouth daily. CARDURA 8 mg tablet Generic drug:  doxazosin Take 1 Tab by mouth nightly. clotrimazole-betamethasone topical cream  
Commonly known as:  Gregoria Mano Apply  to affected area two (2) times a day. digoxin 0.25 mg tablet Commonly known as:  LANOXIN Take 1 Tab by mouth daily. fluorouracil 5 % chemo cream  
Commonly known as:  EFUDEX Glucosamine &Chondroit-MV-Min3 930-690-38-0.5 mg Tab Take  by mouth daily. metoprolol succinate 100 mg tablet Commonly known as:  TOPROL-XL  
TAKE ONE TABLET BY MOUTH 2 TIMES A DAY  
  
 omeprazole 20 mg capsule Commonly known as:  PRILOSEC  
TAKE 1 CAPSULE NIGHTLY  
  
 RAPAFLO 8 mg capsule Generic drug:  silodosin Take 8 mg by mouth daily (with breakfast). VITAMIN D3 1,000 unit Cap Generic drug:  cholecalciferol Take  by mouth daily. warfarin 5 mg tablet Commonly known as:  COUMADIN Take 1 Tab by mouth nightly. Indications: CEREBRAL THROMBOEMBOLISM PREVENTION We Performed the Following AMB POC PT/INR [32470 CPT(R)] COLLECTION CAPILLARY BLOOD SPECIMEN [35784 CPT(R)] REFERRAL TO PHYSICAL THERAPY [CUG66 Custom] Comments:  
 Please evaluate patient for left hip and back pain. Referral Information Referral ID Referred By Referred To  
  
 [id number] Dickson BERNAL Not Available Visits Status Start Date End Date 1 New Request 6/2/17 6/2/18 If your referral has a status of pending review or denied, additional information will be sent to support the outcome of this decision. Introducing [facility] & HEALTH SERVICES! Venkatesh Arias introduces [employer] patient portal. Now you can access parts of your medical record, email your doctor's office, and request medication refills online. 1. In your internet browser, go to [email or web address] 2. Click on the First Time User? Click Here link in the Sign In box. You will see the New Member Sign Up page. 3. Enter your [employer] Access Code exactly as it appears below. You will not need to use this code after youve completed the sign-up process. If you do not sign up before the expiration date, you must request a new code. · [employer] Access Code: [email or web address] Expires: 8/1/2017  2:14 PM 
 
4. Enter the last four digits of your Social Security Number (xxxx) and Date of Birth (mm/dd/yyyy) as indicated and click Submit. You will be taken to the next sign-up page. 5. Create a [employer] ID. This will be your [employer] login ID and cannot be changed, so think of one that is secure and easy to remember. 6. Create a [employer] password. You can change your password at any time. 7. Enter your Password Reset Question and Answer. This can be used at a later time if you forget your password. 8. Enter your e-mail address. You will receive e-mail notification when new information is available in [address] 9. Click Sign Up. You can now view and download portions of your medical record. 10. Click the Download Summary menu link to download a portable copy of your medical information. If you have questions, please visit the Frequently Asked Questions section of the [employer] website. Remember, [employer] is NOT to be used for urgent needs. For medical emergencies, dial 911. Now available from your iPhone and Android! Please provide this summary of care documentation to your next provider. Your primary care clinician is listed as Kelsy Hines. If you have any questions after today's visit, please call [phone].

## 2017-06-02 NOTE — PROGRESS NOTES
Chief Complaint   Patient presents with    Irregular Heart Beat    Back Pain         HPI:      Aleksandra Toth is a 64 y.o. 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 He has recovered nicely from an acute Cervical Disc herniation last year. He has resumed golf. He has Atrial Fibrillation and is anticoagulated with Warfarin. Metoprolol has worked well for rate control. H has had a prostatectomy for prostate cancer. Still having left hip pain. Just returned from [place] in late April. No changes in medical regimen. Allergies   Allergen Reactions    Lactose Other (comments)     Intolerance? Problems after ice cream, some cheese    Pradaxa [Dabigatran Etexilate] Other (comments)     Affected vision       Current Outpatient Prescriptions   Medication Sig    silodosin (RAPAFLO) 8 mg capsule Take 8 mg by mouth daily (with breakfast).  Glucosamine &Chondroit-MV-Min3 002-145-96-0.5 mg tab Take  by mouth daily.  VITAMIN B COMPLEX/FOLIC ACID (B COMPLEX 112 PO) Take  by mouth daily.  Cholecalciferol, Vitamin D3, (VITAMIN D3) 1,000 unit cap Take  by mouth daily.  fluorouracil (EFUDEX) 5 % chemo cream     ammonium lactate (AMLACTIN) 12 % topical cream Apply  to affected area two (2) times a day. rub in to affected area well    omeprazole (PRILOSEC) 20 mg capsule TAKE 1 CAPSULE NIGHTLY    CARDURA 8 mg tablet Take 1 Tab by mouth nightly.  warfarin (COUMADIN) 5 mg tablet Take 1 Tab by mouth nightly. Indications: CEREBRAL THROMBOEMBOLISM PREVENTION    digoxin (LANOXIN) 0.25 mg tablet Take 1 Tab by mouth daily.  clotrimazole-betamethasone (LOTRISONE) topical cream Apply  to affected area two (2) times a day.  metoprolol succinate (TOPROL-XL) 100 mg XL tablet TAKE ONE TABLET BY MOUTH 2 TIMES A DAY     No current facility-administered medications for this visit.         Past Medical History:   Diagnosis Date    Arthritis     Atrial fibrillation ([facility] [address] 1994    Cancer ([facility] [address] 2008    SKIN X5, squamous    Cervical disc disease     GERD (gastroesophageal reflux disease)     History of prostate biopsy 2006, 2010    Dr Evon Cadet Hypertension     Presbycusis     RBBB (right bundle branch block)     TIA (transient ischemic attack) 1994         ROS:  Denies fever, chills, cough, chest pain, SOB,  nausea, vomiting, or diarrhea. Denies wt loss, wt gain, hemoptysis, hematochezia or melena. Physical Examination:    /90 (BP 1 Location: Left arm, BP Patient Position: Sitting)  Pulse (!) 54  Resp 16  Wt 183 lb (83 kg)  SpO2 97%  BMI 26.26 kg/m2    General: Alert and Ox3, Fluent speech  HEENT:  NC/AT, EOMI, OP: clear  Neck:  Supple, no adenopathy, JVD, mass or bruit  Chest:  Clear to Ausculation, without wheezes, rales, rubs or ronchi  Cardiac: IRRR  Abdomen:  +BS, soft, nontender without palpable HSM  Extremities:  No cyanosis, clubbing or edema  Neurologic:  Ambulatory without assist, CN 2-12 grossly intact. Moves all extremities. Skin: no rash  Lymphadenopathy: no cervical or supraclavicular nodes      ASSESSMENT AND PLAN:     1. Left hip pain:  Refer to [facility]  2. AF:  Continue warfarin 5 mg x 6 days; skip one day weekly  3.   RTC in a month    Orders Placed This Encounter    COLLECTION CAPILLARY BLOOD SPECIMEN    REFERRAL TO PHYSICAL THERAPY     Referral Priority:   Routine     Referral Type:   PT/OT/ST     Referral Reason:   Specialty Services Required    AMB POC PT/INR       Robyn Bernstein MD, Italia Abreu

## 2017-07-05 NOTE — PROGRESS NOTES
Chief Complaint   Patient presents with    Anticoagulation     PT/INR check     Rash     on stomach that has been there for over a month          HPI:      Taylor Jean is a 64 y.o. male. Retired . History of AF and is in PT for hip and LBP. Developed a rash on his trunk for the past 3 weeks. No pain. Rash is present on both sides. History of hyperlipidemia and he is due for a lipid profile. New Issues:  Reports no bleeding. Allergies   Allergen Reactions    Lactose Other (comments)     Intolerance? Problems after ice cream, some cheese    Pradaxa [Dabigatran Etexilate] Other (comments)     Affected vision       Current Outpatient Prescriptions   Medication Sig    clotrimazole-betamethasone (LOTRISONE) topical cream AAA BID until clear    metoprolol succinate (TOPROL-XL) 100 mg tablet TAKE ONE TABLET BY MOUTH 2 TIMES A DAY    fluorouracil (EFUDEX) 5 % chemo cream     omeprazole (PRILOSEC) 20 mg capsule TAKE 1 CAPSULE NIGHTLY    CARDURA 8 mg tablet Take 1 Tab by mouth nightly.  warfarin (COUMADIN) 5 mg tablet Take 1 Tab by mouth nightly. Indications: CEREBRAL THROMBOEMBOLISM PREVENTION    silodosin (RAPAFLO) 8 mg capsule Take 8 mg by mouth daily (with breakfast).  Glucosamine &Chondroit-MV-Min3 226-791-78-0.5 mg tab Take  by mouth daily.  VITAMIN B COMPLEX/FOLIC ACID (B COMPLEX 558 PO) Take  by mouth daily.  Cholecalciferol, Vitamin D3, (VITAMIN D3) 1,000 unit cap Take  by mouth daily.  ammonium lactate (AMLACTIN) 12 % topical cream Apply  to affected area two (2) times a day. rub in to affected area well    digoxin (LANOXIN) 0.25 mg tablet Take 1 Tab by mouth daily.  clotrimazole-betamethasone (LOTRISONE) topical cream Apply  to affected area two (2) times a day. No current facility-administered medications for this visit.         Past Medical History:   Diagnosis Date    Arthritis     Atrial fibrillation ([facility] [address] 1994    Cancer ([facility] [address] 2008    SKIN X5, squamous    Cervical disc disease     GERD (gastroesophageal reflux disease)     History of prostate biopsy 2006, 2010    Dr Cary Ferrell Hypertension     Presbycusis     RBBB (right bundle branch block)     TIA (transient ischemic attack) 1994         ROS:  Denies fever, chills, cough, chest pain, SOB,  nausea, vomiting, or diarrhea. Denies wt loss, wt gain, hemoptysis, hematochezia or melena. Physical Examination:    Pulse 87  Resp 19  Wt 182 lb 3.2 oz (82.6 kg)  SpO2 98%  BMI 26.14 kg/m2    General: Alert and Ox3, Fluent speech  HEENT:  NC/AT, EOMI, OP: clear  Neck:  Supple, no adenopathy, JVD, mass or bruit  Chest:  Clear to Ausculation, without wheezes, rales, rubs or ronchi  Cardiac: IRRR  Abdomen:  +BS, soft, nontender without palpable HSM  Extremities:  No cyanosis, clubbing or edema  Neurologic:  Ambulatory without assist, CN 2-12 grossly intact. Moves all extremities. Skin:       Lymphadenopathy: no cervical or supraclavicular nodes      ASSESSMENT AND PLAN:     1.  T coproris:  Lotrisone  2. AF:  Therapeutically anticoagulated  3. LBP:  TENS as needed. Advil occasionally (not more than twice a week)  4. RTC in 6 weeks.     Orders Placed This Encounter    COLLECTION CAPILLARY BLOOD SPECIMEN    LIPID PANEL    AMB POC PT/INR    clotrimazole-betamethasone (LOTRISONE) topical cream     Sig: AAA BID until clear     Dispense:  15 g     Refill:  0       Lizzeth Fagan MD, Gurpreet Payne

## 2017-07-05 NOTE — MR AVS SNAPSHOT
Visit Information Date & Time Provider Department Dept. Phone Encounter #  
 7/5/2017 10:30 [place] [address] [place] [id number] Follow-up Instructions Return in about 6 weeks (around 8/16/2017). Follow-up and Disposition History Upcoming Health Maintenance Date Due  
 GLAUCOMA SCREENING Q2Y 11/21/2003 INFLUENZA AGE 9 TO ADULT 8/1/2017 MEDICARE YEARLY EXAM 5/4/2018 DTaP/Tdap/Td series (2 - Td) 7/5/2027 Allergies as of 7/5/2017  Review Complete On: 7/5/2017 By: Claudene Hughes, MD  
  
 Severity Noted Reaction Type Reactions Lactose  08/18/2015    Other (comments) Intolerance? Problems after ice cream, some cheese Pradaxa [Dabigatran Etexilate]  07/22/2015    Other (comments) Affected vision Current Immunizations  Reviewed on 7/5/2017 Name Date Influenza High Dose Vaccine PF 10/21/2016 Influenza Vaccine 10/20/2015, 10/1/2014 Pneumococcal Conjugate (PCV-13) 7/22/2015 10:18 AM  
 Pneumococcal Vaccine (Unspecified Type) 7/22/2009 Td 5/22/2013 Varicella Virus Vaccine 7/22/2006 Reviewed by Abe Moffett on 7/5/2017 at 10:41 AM  
You Were Diagnosed With   
  
 Codes Comments Chronic atrial fibrillation (HCC)    -  Primary ICD-10-CM: C87.4 ICD-9-CM: 427.31 Essential hypertension     ICD-10-CM: I10 
ICD-9-CM: 401.9 Rash     ICD-10-CM: R21 
ICD-9-CM: 782.1 Vitals Pulse Resp Weight(growth percentile) SpO2 BMI Smoking Status 87 19 182 lb 3.2 oz (82.6 kg) 98% 26.14 kg/m2 Former Smoker Vitals History BMI and BSA Data Body Mass Index Body Surface Area  
 26.14 kg/m 2 2.02 m 2 Preferred Pharmacy Pharmacy Name Phone [facility] [phone] Your Updated Medication List  
  
   
This list is accurate as of: 7/5/17 11:31 AM.  Always use your most recent med list.  
  
  
  
  
 ammonium lactate 12 % topical cream  
Commonly known as:  AMLACTIN Apply  to affected area two (2) times a day. rub in to affected area well B COMPLEX 100 PO Take  by mouth daily. CARDURA 8 mg tablet Generic drug:  doxazosin Take 1 Tab by mouth nightly. * clotrimazole-betamethasone topical cream  
Commonly known as:  Micheal Lopez Apply  to affected area two (2) times a day. * clotrimazole-betamethasone topical cream  
Commonly known as:  LOTRISONE  
AAA BID until clear  
  
 digoxin 0.25 mg tablet Commonly known as:  LANOXIN Take 1 Tab by mouth daily. fluorouracil 5 % chemo cream  
Commonly known as:  EFUDEX Glucosamine &Chondroit-MV-Min3 123-390-23-0.5 mg Tab Take  by mouth daily. metoprolol succinate 100 mg tablet Commonly known as:  TOPROL-XL  
TAKE ONE TABLET BY MOUTH 2 TIMES A DAY  
  
 omeprazole 20 mg capsule Commonly known as:  PRILOSEC  
TAKE 1 CAPSULE NIGHTLY  
  
 RAPAFLO 8 mg capsule Generic drug:  silodosin Take 8 mg by mouth daily (with breakfast). VITAMIN D3 1,000 unit Cap Generic drug:  cholecalciferol Take  by mouth daily. warfarin 5 mg tablet Commonly known as:  COUMADIN Take 1 Tab by mouth nightly. Indications: CEREBRAL THROMBOEMBOLISM PREVENTION  
  
 * Notice: This list has 2 medication(s) that are the same as other medications prescribed for you. Read the directions carefully, and ask your doctor or other care provider to review them with you. Prescriptions Sent to Pharmacy Refills  
 clotrimazole-betamethasone (LOTRISONE) topical cream 0 Sig: AAA BID until clear Class: Normal  
 Pharmacy: [address] #: [phone] We Performed the Following AMB POC PT/INR [61532 CPT(R)] COLLECTION CAPILLARY BLOOD SPECIMEN [41711 CPT(R)] LIPID PANEL [82232 CPT(R)] Follow-up Instructions Return in about 6 weeks (around 8/16/2017). Introducing [facility]! Nicole Ribeiro introduces [employer] patient portal. Now you can access parts of your medical record, email your doctor's office, and request medication refills online. 1. In your internet browser, go to [email or web address] 2. Click on the First Time User? Click Here link in the Sign In box. You will see the New Member Sign Up page. 3. Enter your [employer] Access Code exactly as it appears below. You will not need to use this code after youve completed the sign-up process. If you do not sign up before the expiration date, you must request a new code. · [employer] Access Code: [email or web address] Expires: 8/1/2017  2:14 PM 
 
4. Enter the last four digits of your Social Security Number (xxxx) and Date of Birth (mm/dd/yyyy) as indicated and click Submit. You will be taken to the next sign-up page. 5. Create a [employer] ID. This will be your [employer] login ID and cannot be changed, so think of one that is secure and easy to remember. 6. Create a [employer] password. You can change your password at any time. 7. Enter your Password Reset Question and Answer. This can be used at a later time if you forget your password. 8. Enter your e-mail address. You will receive e-mail notification when new information is available in [address] 9. Click Sign Up. You can now view and download portions of your medical record. 10. Click the Download Summary menu link to download a portable copy of your medical information. If you have questions, please visit the Frequently Asked Questions section of the [employer] website. Remember, [employer] is NOT to be used for urgent needs. For medical emergencies, dial 911. Now available from your iPhone and Android! Please provide this summary of care documentation to your next provider. Your primary care clinician is listed as [employer]. If you have any questions after today's visit, please call [phone].

## 2017-08-23 NOTE — MR AVS SNAPSHOT
Visit Information Date & Time Provider Department Dept. Phone Encounter #  
 8/23/2017 10:30 AM Myrna Villegas MD [address] [place] [address] [id number] Upcoming Health Maintenance Date Due INFLUENZA AGE 9 TO ADULT 8/1/2017 MEDICARE YEARLY EXAM 5/4/2018 GLAUCOMA SCREENING Q2Y 11/14/2018 DTaP/Tdap/Td series (2 - Td) 7/5/2027 Allergies as of 8/23/2017  Review Complete On: 8/23/2017 By: Corry Hernandez Severity Noted Reaction Type Reactions Lactose  08/18/2015    Other (comments) Intolerance? Problems after ice cream, some cheese Pradaxa [Dabigatran Etexilate]  07/22/2015    Other (comments) Affected vision Current Immunizations  Reviewed on 8/23/2017 Name Date Influenza High Dose Vaccine PF 10/21/2016 Influenza Vaccine 10/20/2015, 10/1/2014 Pneumococcal Conjugate (PCV-13) 7/22/2015 10:18 AM  
 Pneumococcal Vaccine (Unspecified Type) 7/22/2009 Td 5/22/2013 Varicella Virus Vaccine 7/22/2006 Reviewed by Corry Hernandez on 8/23/2017 at 10:41 AM  
You Were Diagnosed With   
  
 Codes Comments Chronic atrial fibrillation (HCC)    -  Primary ICD-10-CM: G16.3 ICD-9-CM: 427.31 Vitals BP Pulse Resp Weight(growth percentile) SpO2 BMI  
 125/82 (BP 1 Location: Right arm, BP Patient Position: Sitting) 67 19 179 lb 6.4 oz (81.4 kg) 97% 25.74 kg/m2 Smoking Status Former Smoker Vitals History BMI and BSA Data Body Mass Index Body Surface Area 25.74 kg/m 2 2.01 m 2 Preferred Pharmacy Pharmacy Name Phone [facility] 79 [phone] Your Updated Medication List  
  
   
This list is accurate as of: 8/23/17 11:23 AM.  Always use your most recent med list.  
  
  
  
  
 ammonium lactate 12 % topical cream  
Commonly known as:  AMLACTIN Apply  to affected area two (2) times a day. rub in to affected area well B COMPLEX 100 PO Take  by mouth daily. CARDURA 8 mg tablet Generic drug:  doxazosin Take 1 Tab by mouth nightly. * clotrimazole-betamethasone topical cream  
Commonly known as:  [place] Apply  to affected area two (2) times a day. * clotrimazole-betamethasone topical cream  
Commonly known as:  LOTRISONE  
AAA BID until clear  
  
 digoxin 0.25 mg tablet Commonly known as:  LANOXIN  
TAKE ONE TABLET BY MOUTH EVERY DAY  
  
 fluorouracil 5 % chemo cream  
Commonly known as:  EFUDEX Glucosamine &Chondroit-MV-Min3 785-832-81-0.5 mg Tab Take  by mouth daily. metoprolol succinate 100 mg tablet Commonly known as:  TOPROL-XL  
TAKE ONE TABLET BY MOUTH 2 TIMES A DAY  
  
 omeprazole 20 mg capsule Commonly known as:  PRILOSEC  
TAKE 1 CAPSULE NIGHTLY  
  
 RAPAFLO 8 mg capsule Generic drug:  silodosin Take 8 mg by mouth daily (with breakfast). VITAMIN D3 1,000 unit Cap Generic drug:  cholecalciferol Take  by mouth daily. warfarin 5 mg tablet Commonly known as:  COUMADIN Take 1 Tab by mouth nightly. Indications: CEREBRAL THROMBOEMBOLISM PREVENTION  
  
 * Notice: This list has 2 medication(s) that are the same as other medications prescribed for you. Read the directions carefully, and ask your doctor or other care provider to review them with you. We Performed the Following AMB POC PT/INR [61071 CPT(R)] COLLECTION CAPILLARY BLOOD SPECIMEN [10205 CPT(R)] Patient Instructions If you have any questions regarding [employer], you may call [employer] support at [phone]. Introducing \[facility]\"" & HEALTH SERVICES! [facility] introduces [employer] patient portal. Now you can access parts of your medical record, email your doctor's office, and request medication refills online. 1. In your internet browser, go to [email or web address] 2. Click on the First Time User? Click Here link in the Sign In box.  You will see the New Member Sign Up page. 3. Enter your [employer] Access Code exactly as it appears below. You will not need to use this code after youve completed the sign-up process. If you do not sign up before the expiration date, you must request a new code. · [employer] Access Code: [email or web address] Expires: 11/21/2017 11:22 AM 
 
4. Enter the last four digits of your Social Security Number (xxxx) and Date of Birth (mm/dd/yyyy) as indicated and click Submit. You will be taken to the next sign-up page. 5. Create a [employer] ID. This will be your [employer] login ID and cannot be changed, so think of one that is secure and easy to remember. 6. Create a [employer] password. You can change your password at any time. 7. Enter your Password Reset Question and Answer. This can be used at a later time if you forget your password. 8. Enter your e-mail address. You will receive e-mail notification when new information is available in [address] 9. Click Sign Up. You can now view and download portions of your medical record. 10. Click the Download Summary menu link to download a portable copy of your medical information. If you have questions, please visit the Frequently Asked Questions section of the [employer] website. Remember, [employer] is NOT to be used for urgent needs. For medical emergencies, dial 911. Now available from your iPhone and Android! Please provide this summary of care documentation to your next provider. Your primary care clinician is listed as Myrna Villegas. If you have any questions after today's visit, please call [phone].

## 2017-08-23 NOTE — PROGRESS NOTES
Chief Complaint   Patient presents with    Anticoagulation     INR Checkup     Leg Injury     right leg surgery site          HPI:      Erlinda Guido is a 64 y.o. male. Retired . Chronic AF anticoagulated with Warfarin 5 mg six days a week. No bleeding or TIA sx. Recently had right LE lesion excised in [facility]. After suture removal developed drainage and redness about the wound and was prescribed Keflex by his surgeon and this was extended after a trip to Urgent Care while on a beach vacation. Allergies   Allergen Reactions    Lactose Other (comments)     Intolerance? Problems after ice cream, some cheese    Pradaxa [Dabigatran Etexilate] Other (comments)     Affected vision       Current Outpatient Prescriptions   Medication Sig    digoxin (LANOXIN) 0.25 mg tablet TAKE ONE TABLET BY MOUTH EVERY DAY    clotrimazole-betamethasone (LOTRISONE) topical cream AAA BID until clear    metoprolol succinate (TOPROL-XL) 100 mg tablet TAKE ONE TABLET BY MOUTH 2 TIMES A DAY    fluorouracil (EFUDEX) 5 % chemo cream     ammonium lactate (AMLACTIN) 12 % topical cream Apply  to affected area two (2) times a day. rub in to affected area well    omeprazole (PRILOSEC) 20 mg capsule TAKE 1 CAPSULE NIGHTLY    CARDURA 8 mg tablet Take 1 Tab by mouth nightly.  warfarin (COUMADIN) 5 mg tablet Take 1 Tab by mouth nightly. Indications: CEREBRAL THROMBOEMBOLISM PREVENTION    clotrimazole-betamethasone (LOTRISONE) topical cream Apply  to affected area two (2) times a day.  silodosin (RAPAFLO) 8 mg capsule Take 8 mg by mouth daily (with breakfast).  Glucosamine &Chondroit-MV-Min3 836-453-71-0.5 mg tab Take  by mouth daily.  VITAMIN B COMPLEX/FOLIC ACID (B COMPLEX 577 PO) Take  by mouth daily.  Cholecalciferol, Vitamin D3, (VITAMIN D3) 1,000 unit cap Take  by mouth daily. No current facility-administered medications for this visit.         Past Medical History:   Diagnosis Date    Arthritis  Atrial fibrillation ([facility] [address] 1994    Cancer ([facility] [address] 2008    SKIN X5, squamous    Cervical disc disease     GERD (gastroesophageal reflux disease)     History of prostate biopsy 2006, 2010    Dr Selwyn Huizar Hypertension     Presbycusis     RBBB (right bundle branch block)     TIA (transient ischemic attack) 1994         ROS:  Denies fever, chills, cough, chest pain, SOB,  nausea, vomiting, or diarrhea. Denies wt loss, wt gain, hemoptysis, hematochezia or melena. Physical Examination:    /82 (BP 1 Location: Right arm, BP Patient Position: Sitting)  Pulse 67  Resp 19  Wt 179 lb 6.4 oz (81.4 kg)  SpO2 97%  BMI 25.74 kg/m2    General: Alert and Ox3, Fluent speech  HEENT:  NC/AT, EOMI, OP: clear  Neck:  Supple, no adenopathy, JVD, mass or bruit  Chest:  Clear to Ausculation, without wheezes, rales, rubs or ronchi  Cardiac: IRRR  Abdomen:  +BS, soft, nontender without palpable HSM  Extremities:  No cyanosis, clubbing or edema  Neurologic:  Ambulatory without assist, CN 2-12 grossly intact. Moves all extremities. Skin:           Lymphadenopathy: no cervical or supraclavicular nodes    INR - 2.1    ASSESSMENT AND PLAN:     1.  AF:  He is therapeutically anticoagulated and the Keflex has not seemed to alter his INR at this time. Continue 5 mg Warfarin for 6 days weekly  2. Leg Wound:  Cultured today. RTC Frid if not better. Orders Placed This Encounter    COLLECTION CAPILLARY BLOOD SPECIMEN    AEROBIC BACTERIAL CULTURE     Leg wound, post op. Draining yellow pus.     AMB POC PT/INR       Vin Coleman MD, Gerhardt Curl

## 2017-09-11 NOTE — PROGRESS NOTES
Francine Caputo is a 64 y.o. male presenting for/with: Incisional Pain (right leg, incision pain. Operation on August 2nd for skin cancer. )    HPI:  Symptoms include R medial shin pain for 2 weeks. Had [facility] removed by Dr. Jerelene Sicard at [facility] on [facility] in [place]. Did ok at first but took a while to get sutures removed, removed about a month out. Saw his derm at [facility] corner early this mo, they rec starting aquaphor, but over past week has seen increased redness, some gooey yellow d/c without red streaks, increased redness, soreness worsening over past few days. Pt got a little nervous about htat and made appt to come in.    PMH, SH, Medications/Allergies: reviewed, on chart. ROS:  Constitutional: No fever, chills or weight loss  Respiratory: No cough, SOB   CV: No chest pain or Palpitations    Visit Vitals    /90 (BP 1 Location: Right arm, BP Patient Position: Sitting)    Pulse 65    Temp 96 °F (35.6 °C) (Oral)    Resp 17    Wt 179 lb 12.8 oz (81.6 kg)    SpO2 98%    BMI 25.8 kg/m2     Wt Readings from Last 3 Encounters:   09/11/17 179 lb 12.8 oz (81.6 kg)   08/23/17 179 lb 6.4 oz (81.4 kg)   07/05/17 182 lb 3.2 oz (82.6 kg)     BP Readings from Last 3 Encounters:   09/11/17 142/90   08/23/17 125/82   06/02/17 154/90     Physical Examination: General appearance - alert, well appearing, and in no distress  Mental status - alert, oriented to person, place, and time  Eyes - pupils equal and reactive, extraocular eye movements intact  ENT - bilateral external ears and nose normal. Normal lips  Extremities - peripheral pulses normal, no pedal edema, no clubbing or cyanosis. R medial shin with 3cm x 1cm linear ulceration with good granulation tissue, but some yellow serous d/c with some necrotic tissue along the midline. Mod venous stasis changes present as well, with dilated veins, nttp.     A/P:  Impetigo to surgical wound, mild  Debrided a little in office with gentle 4x4 curettage. Will clean up rest of devitalized tissue with santyl cream AAA in thin coat for 5 days, then con't to use clean emollient until healed. Use a compression wrap or tall stocking to decrease venous stasis. F/u 1-2 wk for recheck.

## 2017-09-11 NOTE — PATIENT INSTRUCTIONS
Venous Skin Ulcer: Care Instructions  Your Care Instructions  A venous skin ulcer is a shallow wound that develops when the leg veins do not move blood back to the heart normally. Your veins have one-way valves that keep blood flowing toward the heart. When the valves are damaged, the blood can back up and pool in the vein. The blood may leak out of the vein into tissue around the vein. The tissue can break down and form an ulcer. The first sign of a venous skin ulcer is skin that turns dark red or purple over the area where the blood is leaking out of the vein. The skin also may become thick, dry, and itchy. Without treatment, an ulcer may form. The ulcer may be painful. Your leg also may swell and ache. If the ulcer becomes infected, the infection may cause an odor, and pus may drain from the ulcer. The area around the ulcer also may be more tender and red. Follow-up care is a key part of your treatment and safety. Be sure to make and go to all appointments, and call your doctor if you are having problems. It's also a good idea to know your test results and keep a list of the medicines you take. How can you care for yourself at home? · Follow your doctor's instructions on how to clean the ulcer and change the bandage. · If your doctor prescribed antibiotics, take them as directed. Do not stop taking them just because you feel better. You need to take the full course of antibiotics. · Lift your legs above the level of your heart as often as possible. For example, lie down and then prop up your legs with pillows. · Wear compression stockings or bandages. They help the blood circulate in your legs. And they help prevent blood from pooling in your legs. But there are different types of stockings, and they need to fit right. So your doctor will recommend what you need. · After your ulcer has healed, continue to wear compression stockings. Take them off only when you bathe and sleep.  Compression helps your blood circulate and helps prevent other ulcers from forming. · Walk daily. Walking helps your blood circulation. When should you call for help? Call your doctor now or seek immediate medical care if:  · You have symptoms of infection, such as:  ¨ Increased pain, swelling, warmth, or redness. ¨ Red streaks leading from the ulcer. ¨ Pus draining from the ulcer. ¨ A fever. Watch closely for changes in your health, and be sure to contact your doctor if:  · Your ulcer is not healing. · You have new ulcers. · The ulcer starts to bleed, and blood soaks through the bandage. Oozing small amounts of a mix of blood and fluid is normal.  · You have new bleeding. · You do not get better as expected. Where can you learn more? Go to http://lance-rafy.info/. Enter E475 in the search box to learn more about \"Venous Skin Ulcer: Care Instructions. \"  Current as of: March 20, 2017  Content Version: 11.3  © 8389-9115 [employer]. Care instructions adapted under license by [employer] (which disclaims liability or warranty for this information). If you have questions about a medical condition or this instruction, always ask your healthcare professional. [place] [address] any warranty or liability for your use of this information. If you have any questions regarding [employer], you may call [employer] support at [phone].

## 2017-09-11 NOTE — MR AVS SNAPSHOT
Visit Information Date & Time Provider Department Dept. Phone Encounter #  
 9/11/2017 11:45 AM Duy Rader MD [address] [place] [address] [id number] Follow-up Instructions Return in about 2 weeks (around 9/25/2017). Follow-up and Disposition History Your Appointments 9/22/2017 10:00 AM  
ESTABLISHED PATIENT with Lucretia Caldwell MD  
[facility] Appt Note: 1 mo f/u pt/inr  
 1000 [facility] [address] [facility],5Th Floor [address] [phone]  
  
   
 [address] [address] Upcoming Health Maintenance Date Due INFLUENZA AGE 9 TO ADULT 8/1/2017 MEDICARE YEARLY EXAM 5/4/2018 GLAUCOMA SCREENING Q2Y 11/14/2018 DTaP/Tdap/Td series (2 - Td) 7/5/2027 Allergies as of 9/11/2017  Review Complete On: 9/11/2017 By: Duy Rader MD  
  
 Severity Noted Reaction Type Reactions Lactose  08/18/2015    Other (comments) Intolerance? Problems after ice cream, some cheese Pradaxa [Dabigatran Etexilate]  07/22/2015    Other (comments) Affected vision Current Immunizations  Reviewed on 8/23/2017 Name Date Influenza High Dose Vaccine PF 10/21/2016 Influenza Vaccine 10/20/2015, 10/1/2014 Pneumococcal Conjugate (PCV-13) 7/22/2015 10:18 AM  
 Pneumococcal Vaccine (Unspecified Type) 7/22/2009 Td 5/22/2013 Varicella Virus Vaccine 7/22/2006 Not reviewed this visit You Were Diagnosed With   
  
 Codes Comments Venous stasis dermatitis of right lower extremity    -  Primary ICD-10-CM: I87.2 ICD-9-CM: 454.1 Open wound of leg, right, initial encounter     ICD-10-CM: P53.384G ICD-9-CM: 891.0 Impetigo     ICD-10-CM: L01.00 ICD-9-CM: 740 Vitals BP Pulse Temp Resp Weight(growth percentile) SpO2  
 142/90 (BP 1 Location: Right arm, BP Patient Position: Sitting) 65 96 °F (35.6 °C) (Oral) 17 179 lb 12.8 oz (81.6 kg) 98% BMI Smoking Status 25.8 kg/m2 Former Smoker Vitals History BMI and BSA Data Body Mass Index Body Surface Area  
 25.8 kg/m 2 2.01 m 2 Preferred Pharmacy Pharmacy Name Phone [facility] [place] [address] [phone] Your Updated Medication List  
  
   
This list is accurate as of: 9/11/17 12:58 PM.  Always use your most recent med list.  
  
  
  
  
 ammonium lactate 12 % topical cream  
Commonly known as:  AMLACTIN Apply  to affected area two (2) times a day. rub in to affected area well B COMPLEX 100 PO Take  by mouth daily. CARDURA 8 mg tablet Generic drug:  doxazosin Take 1 Tab by mouth nightly. clotrimazole-betamethasone topical cream  
Commonly known as:  Mir Sand Apply  to affected area two (2) times a day. collagenase 250 unit/gram ointment Commonly known as:  SANTYL Apply  to affected area daily. digoxin 0.25 mg tablet Commonly known as:  LANOXIN  
TAKE ONE TABLET BY MOUTH EVERY DAY  
  
 fluorouracil 5 % chemo cream  
Commonly known as:  EFUDEX Glucosamine &Chondroit-MV-Min3 108-258-90-0.5 mg Tab Take  by mouth daily. metoprolol succinate 100 mg tablet Commonly known as:  TOPROL-XL  
TAKE ONE TABLET BY MOUTH 2 TIMES A DAY  
  
 omeprazole 20 mg capsule Commonly known as:  PRILOSEC  
TAKE 1 CAPSULE NIGHTLY  
  
 RAPAFLO 8 mg capsule Generic drug:  silodosin Take 8 mg by mouth daily (with breakfast). VITAMIN D3 1,000 unit Cap Generic drug:  cholecalciferol Take  by mouth daily. warfarin 5 mg tablet Commonly known as:  COUMADIN Take 1 Tab by mouth nightly. Indications: CEREBRAL THROMBOEMBOLISM PREVENTION Prescriptions Sent to Pharmacy Refills  
 collagenase (SANTYL) 250 unit/gram ointment 1 Sig: Apply  to affected area daily. Class: Normal  
 Pharmacy: [place] [address] Ph #: [phone] Route: Topical  
  
Follow-up Instructions Return in about 2 weeks (around 9/25/2017). Patient Instructions Venous Skin Ulcer: Care Instructions Your Care Instructions A venous skin ulcer is a shallow wound that develops when the leg veins do not move blood back to the heart normally. Your veins have one-way valves that keep blood flowing toward the heart. When the valves are damaged, the blood can back up and pool in the vein. The blood may leak out of the vein into tissue around the vein. The tissue can break down and form an ulcer. The first sign of a venous skin ulcer is skin that turns dark red or purple over the area where the blood is leaking out of the vein. The skin also may become thick, dry, and itchy. Without treatment, an ulcer may form. The ulcer may be painful. Your leg also may swell and ache. If the ulcer becomes infected, the infection may cause an odor, and pus may drain from the ulcer. The area around the ulcer also may be more tender and red. Follow-up care is a key part of your treatment and safety. Be sure to make and go to all appointments, and call your doctor if you are having problems. It's also a good idea to know your test results and keep a list of the medicines you take. How can you care for yourself at home? · Follow your doctor's instructions on how to clean the ulcer and change the bandage. · If your doctor prescribed antibiotics, take them as directed. Do not stop taking them just because you feel better. You need to take the full course of antibiotics. · Lift your legs above the level of your heart as often as possible. For example, lie down and then prop up your legs with pillows. · Wear compression stockings or bandages. They help the blood circulate in your legs. And they help prevent blood from pooling in your legs. But there are different types of stockings, and they need to fit right. So your doctor will recommend what you need. · After your ulcer has healed, continue to wear compression stockings. Take them off only when you bathe and sleep. Compression helps your blood circulate and helps prevent other ulcers from forming. · Walk daily. Walking helps your blood circulation. When should you call for help? Call your doctor now or seek immediate medical care if: 
· You have symptoms of infection, such as: 
¨ Increased pain, swelling, warmth, or redness. ¨ Red streaks leading from the ulcer. ¨ Pus draining from the ulcer. ¨ A fever. Watch closely for changes in your health, and be sure to contact your doctor if: 
· Your ulcer is not healing. · You have new ulcers. · The ulcer starts to bleed, and blood soaks through the bandage. Oozing small amounts of a mix of blood and fluid is normal. 
· You have new bleeding. · You do not get better as expected. Where can you learn more? Go to http://lance-rafy.info/. Enter H624 in the search box to learn more about \"Venous Skin Ulcer: Care Instructions. \" Current as of: March 20, 2017 Content Version: 11.3 © 7012-3822 [employer]. Care instructions adapted under license by [employer] (which disclaims liability or warranty for this information). If you have questions about a medical condition or this instruction, always ask your healthcare professional. [address] any warranty or liability for your use of this information. If you have any questions regarding [employer], you may call [employer] support at [phone]. Patient Instructions History Introducing \[facility] [place] [facility]\"" & HEALTH SERVICES! Jenny Okeefe introduces [employer] patient portal. Now you can access parts of your medical record, email your doctor's office, and request medication refills online. 1. In your internet browser, go to [email or web address] [email or web address] 2. Click on the First Time User? Click Here link in the Sign In box.  You will see the New Member Sign Up page. 3. Enter your Eventtus Access Code exactly as it appears below. You will not need to use this code after youve completed the sign-up process. If you do not sign up before the expiration date, you must request a new code. · Eventtus Access Code: [email or web address] Expires: 11/21/2017 11:22 AM 
 
4. Enter the last four digits of your Social Security Number (xxxx) and Date of Birth (mm/dd/yyyy) as indicated and click Submit. You will be taken to the next sign-up page. 5. Create a Eventtus ID. This will be your Eventtus login ID and cannot be changed, so think of one that is secure and easy to remember. 6. Create a Eventtus password. You can change your password at any time. 7. Enter your Password Reset Question and Answer. This can be used at a later time if you forget your password. 8. Enter your e-mail address. You will receive e-mail notification when new information is available in [address] 9. Click Sign Up. You can now view and download portions of your medical record. 10. Click the Download Summary menu link to download a portable copy of your medical information. If you have questions, please visit the Frequently Asked Questions section of the Eventtus website. Remember, [employer] is NOT to be used for urgent needs. For medical emergencies, dial 911. Now available from your iPhone and Android! Please provide this summary of care documentation to your next provider. Your primary care clinician is listed as Jason Rowley. If you have any questions after today's visit, please call [phone].

## 2017-09-22 NOTE — MR AVS SNAPSHOT
Visit Information Date & Time Provider Department Dept. Phone Encounter #  
 9/22/2017 10:00 AM Kelli Blanton MD [address] [place] [address] [id number] Upcoming Health Maintenance Date Due  
 MEDICARE YEARLY EXAM 5/4/2018 GLAUCOMA SCREENING Q2Y 11/14/2018 DTaP/Tdap/Td series (2 - Td) 7/5/2027 Allergies as of 9/22/2017  Review Complete On: 9/22/2017 By: Kelli Blanton MD  
  
 Severity Noted Reaction Type Reactions Lactose  08/18/2015    Other (comments) Intolerance? Problems after ice cream, some cheese Pradaxa [Dabigatran Etexilate]  07/22/2015    Other (comments) Affected vision Current Immunizations  Reviewed on 8/23/2017 Name Date Influenza High Dose Vaccine PF 10/21/2016 Influenza Vaccine 10/20/2015, 10/1/2014 Pneumococcal Conjugate (PCV-13) 7/22/2015 10:18 AM  
 Pneumococcal Vaccine (Unspecified Type) 7/22/2009 Td 5/22/2013 Varicella Virus Vaccine 7/22/2006 Not reviewed this visit You Were Diagnosed With   
  
 Codes Comments Chronic atrial fibrillation (HCC)    -  Primary ICD-10-CM: V37.5 ICD-9-CM: 427.31   
 RUQ pain     ICD-10-CM: R10.11 ICD-9-CM: 789.01 Vitals BP Pulse Resp Weight(growth percentile) SpO2 BMI  
 132/84 (BP 1 Location: Left arm, BP Patient Position: Sitting) 74 16 181 lb (82.1 kg) 96% 25.97 kg/m2 Smoking Status Former Smoker BMI and BSA Data Body Mass Index Body Surface Area  
 25.97 kg/m 2 2.01 m 2 Preferred Pharmacy Pharmacy Name Phone [facility] [phone] Your Updated Medication List  
  
   
This list is accurate as of: 9/22/17 11:14 AM.  Always use your most recent med list.  
  
  
  
  
 B COMPLEX 100 PO Take  by mouth daily. CARDURA 8 mg tablet Generic drug:  doxazosin Take 1 Tab by mouth nightly. collagenase 250 unit/gram ointment Commonly known as:  SANTYL Apply  to affected area daily. Glucosamine &Chondroit-MV-Min3 465-116-32-0.5 mg Tab Take  by mouth daily. metoprolol succinate 100 mg tablet Commonly known as:  TOPROL-XL  
TAKE ONE TABLET BY MOUTH 2 TIMES A DAY  
  
 omeprazole 20 mg capsule Commonly known as:  PRILOSEC  
TAKE 1 CAPSULE NIGHTLY  
  
 RAPAFLO 8 mg capsule Generic drug:  silodosin Take 8 mg by mouth daily (with breakfast). VITAMIN D3 1,000 unit Cap Generic drug:  cholecalciferol Take  by mouth daily. warfarin 5 mg tablet Commonly known as:  COUMADIN Take 1 Tab by mouth nightly. Indications: CEREBRAL THROMBOEMBOLISM PREVENTION We Performed the Following AMB POC PT/INR [16721 CPT(R)] CBC WITH AUTOMATED DIFF [59645 CPT(R)] COLLECTION CAPILLARY BLOOD SPECIMEN [67699 CPT(R)] METABOLIC PANEL, COMPREHENSIVE [98966 CPT(R)] To-Do List   
 09/29/2017 Imaging:  US ABD LTD   
  
 09/29/2017 Imaging:  XR CHEST PA LAT Introducing [facility] [place] & HEALTH SERVICES! [address] [place] [address] introduces [employer] patient portal. Now you can access parts of your medical record, email your doctor's office, and request medication refills online. 1. In your internet browser, go to [email or web address] 2. Click on the First Time User? Click Here link in the Sign In box. You will see the New Member Sign Up page. 3. Enter your [employer] Access Code exactly as it appears below. You will not need to use this code after youve completed the sign-up process. If you do not sign up before the expiration date, you must request a new code. · [employer] Access Code: [email or web address] Expires: 11/21/2017 11:22 AM 
 
4. Enter the last four digits of your Social Security Number (xxxx) and Date of Birth (mm/dd/yyyy) as indicated and click Submit. You will be taken to the next sign-up page. 5. Create a H[employer]anna ID.  This will be your H[employer]anna login ID and cannot be changed, so think of one that is secure and easy to remember. 6. Create a Vault Dragon password. You can change your password at any time. 7. Enter your Password Reset Question and Answer. This can be used at a later time if you forget your password. 8. Enter your e-mail address. You will receive e-mail notification when new information is available in [address] 9. Click Sign Up. You can now view and download portions of your medical record. 10. Click the Download Summary menu link to download a portable copy of your medical information. If you have questions, please visit the Frequently Asked Questions section of the Vault Dragon website. Remember, Vault Dragon is NOT to be used for urgent needs. For medical emergencies, dial 911. Now available from your iPhone and Android! Please provide this summary of care documentation to your next provider. Your primary care clinician is listed as Anjelica Elmore. If you have any questions after today's visit, please call [phone].

## 2017-09-23 NOTE — PROGRESS NOTES
Patient notified by phone of lab results. No further pain. He is scheduled for a RUQ US next week. If negative, then I would proceed to CT given his LFTs.     Susi Schroeder

## 2017-09-28 NOTE — PROGRESS NOTES
Abd US:  Liver lesions. Cysts. Cannot exclude mets    Liver enzymes are up. Needs CT scan. Patient is aware.     Sami Whitney

## 2017-10-04 NOTE — PROGRESS NOTES
Attempted pre call  X 2 regarding upcoming procedure. No answer, unable to leave message on generic VM.

## 2017-10-04 NOTE — PROGRESS NOTES
Pre call completed with patient regarding upcoming procedure. Patient states he last took his Coumadin 1 week ago.

## 2017-10-05 NOTE — IP AVS SNAPSHOT
[address] [facility] 
[phone] Patient: Lester Fitzgerald MRN: [id number] WG You are allergic to the following Allergen Reactions Lactose Other (comments) Intolerance? Problems after ice cream, some cheese Pradaxa (Dabigatran Etexilate) Other (comments) Affected vision Recent Documentation Height Weight BMI Smoking Status 1.778 m 79.4 kg 25.11 kg/m2 Former Smoker Emergency Contacts Name Discharge Info Relation Home Work Mobile [address] CAREGIVER [3] Spouse [3] [phone] About your hospitalization You were admitted on:  2017 You last received care in the:  [facility] RAD CT You were discharged on:  2017 Unit phone number:  [phone] Why you were hospitalized Your primary diagnosis was:  Not on File Providers Seen During Your Hospitalizations Provider Role Specialty Primary office phone Lance Guerra MD Attending Provider Internal Medicine [phone] Your Primary Care Physician (PCP) Primary Care Physician Office Phone Office Fax Suman Knight [phone] Follow-up Information None Your Appointments 2017 11:30 AM EDT New Patient with Dick Bhatti MD  
[address] [place] [address] Oncology at [facility]) [address] [place] [address] [address] [place] [address]  
[phone] 2017  9:45 AM EDT  
ESTABLISHED PATIENT with [facility] 1000 [facility] 2200 [facility] Floor [id number] [phone] Current Discharge Medication List  
  
ASK your doctor about these medications Dose & Instructions Dispensing Information Comments Morning Noon Evening Bedtime  B COMPLEX 100 PO  
   
 Your last dose was: Your next dose is: Take  by mouth daily. Refills:  0  
     
   
   
   
  
 CARDURA 8 mg tablet Generic drug:  doxazosin Your last dose was: Your next dose is: Take 1 Tab by mouth nightly. Quantity:  90 Tab Refills:  4  
     
   
   
   
  
 collagenase 250 unit/gram ointment Commonly known as:  SANTYL Your last dose was: Your next dose is:    
   
   
 Apply  to affected area daily. Quantity:  15 g Refills:  1 Glucosamine &Chondroit-MV-Min3 772-144-03-0.5 mg Tab Your last dose was: Your next dose is: Take  by mouth daily. Refills:  0  
     
   
   
   
  
 metoprolol succinate 100 mg tablet Commonly known as:  TOPROL-XL Your last dose was: Your next dose is: TAKE ONE TABLET BY MOUTH 2 TIMES A DAY Quantity:  180 Tab Refills:  4  
     
   
   
   
  
 omeprazole 20 mg capsule Commonly known as:  PRILOSEC Your last dose was: Your next dose is: TAKE 1 CAPSULE NIGHTLY Quantity:  90 Cap Refills:  4 RAPAFLO 8 mg capsule Generic drug:  silodosin Your last dose was: Your next dose is:    
   
   
 Dose:  8 mg Take 8 mg by mouth daily (with breakfast). Refills:  0  
     
   
   
   
  
 VITAMIN D3 1,000 unit Cap Generic drug:  cholecalciferol Your last dose was: Your next dose is: Take  by mouth daily. Refills:  0  
     
   
   
   
  
 warfarin 5 mg tablet Commonly known as:  COUMADIN Your last dose was: Your next dose is: Take 1 Tab by mouth nightly. Indications: CEREBRAL THROMBOEMBOLISM PREVENTION Quantity:  90 Tab Refills:  4 Discharge Instructions [address] [facility] General Instructions: A biopsy is the removal of a small piece of tissue for microscopic examination or testing. Healthy tissue can be obtained for the purpose of tissue-type matching for transplants. Unhealthy tissues are more commonly biopsied to diagnose disease. .  
Liver Biopsy: This test helps detect cancer, infections, and the cause of an enlargement of the liver or elevated liver enzymes. It also helps to diagnose a number of liver diseases. The pain associated with the procedure may be felt in the shoulder. The risks include internal bleeding, pneumothorax, and injury to the surrounding organs. General Biopsy: 
   A mass can grow in any area of the body, and we are taking a specimen as ordered by your doctor. The risks are the same. They include bleeding, pain, and infection. Home Care Instructions: You may resume your regular diet and medication regimen. Do not drink alcohol, drive, or make any important legal decisions in the next 24 hours. Do not lift anything heavier than a gallon of milk until the soreness goes away. You may use over the counter acetaminophen or ibuprofen for the soreness. You may apply an ice pack to the affected area for 20-30 minutes at time for the first 24 hours. After that, you may apply a heat pack. Call If: You should call your Physician and/or the Radiology Nurse if you have any questions or concerns about the biopsy site. Call if you should have increased pain, fever, redness, drainage, or bleeding more than a small spot on the bandage. Follow-Up Instructions: Please see your ordering doctor as he/she has requested. To Reach Us:   
[phone] Patient Signature: 
Date: 10/5/2017 Discharging Nurse: Winsome Vanessa RN Discharge Orders None ACO Transitions of Care [facility] [address] offers a voluntary care coordination program to provide high quality service and care to [facility] fee-for-service beneficiaries. Harini Marks was designed to help you enhance your health and well-being through the following services: ? Transitions of Care  support for individuals who are transitioning from one care setting to another (example: Hospital to home). ? Chronic and Complex Care Coordination  support for individuals and caregivers of those with serious or chronic illnesses or with more than one chronic (ongoing) condition and those who take a number of different medications. If you meet specific medical criteria, a [address] [place] [address] Rd may call you directly to coordinate your care with your primary care physician and your other care providers. For questions about the [facility] programs, please, contact your physicians office. For general questions or additional information about Accountable Care Organizations: 
Please visit www.medicare.gov/acos. html or call 1-800-MEDICARE (0-696.661.4712) TTY users should call 7-698.271.7638. Introducing [facility] & HEALTH SERVICES! Eve Ash introduces [employer] patient portal. Now you can access parts of your medical record, email your doctor's office, and request medication refills online. 1. In your internet browser, go to [email or web address] Command 2. Click on the First Time User? Click Here link in the Sign In box. You will see the New Member Sign Up page. 3. Enter your [employer] Access Code exactly as it appears below. You will not need to use this code after youve completed the sign-up process. If you do not sign up before the expiration date, you must request a new code. · [employer] Access Code: [email or web address] Expires: 11/21/2017 11:22 AM 
 
4. Enter the last four digits of your Social Security Number (xxxx) and Date of Birth (mm/dd/yyyy) as indicated and click Submit. You will be taken to the next sign-up page. 5. Create a WaterplayUSA ID. This will be your [employer] login ID and cannot be changed, so think of one that is secure and easy to remember. 6. Create a [employer] password. You can change your password at any time. 7. Enter your Password Reset Question and Answer. This can be used at a later time if you forget your password. 8. Enter your e-mail address. You will receive e-mail notification when new information is available in [address] 9. Click Sign Up. You can now view and download portions of your medical record. 10. Click the Download Summary menu link to download a portable copy of your medical information. If you have questions, please visit the Frequently Asked Questions section of the [employer] website. Remember, [employer] is NOT to be used for urgent needs. For medical emergencies, dial 911. Now available from your iPhone and Android! General Information Please provide this summary of care documentation to your next provider. Patient Signature:  ____________________________________________________________ Date:  ____________________________________________________________  
  
Ez Donaldson Provider Signature:  ____________________________________________________________ Date:  ____________________________________________________________

## 2017-10-05 NOTE — ROUTINE PROCESS
0955- Pt in for liver biopsy. Workup completed. Dr Chastity Yuan in to consult pt. Pt aware of risks and benefits and wishes to proceed. POC INR 1.0.  MD aware that BP elevated as pt did not take AM meds. Labetalol ordered. 1230- Discharge instructions reviewed with pt.   1315- Pt verbalized understanding of instructions. Discharged to home.

## 2017-10-05 NOTE — DISCHARGE INSTRUCTIONS
[facility] INSTRUCTIONS    General Instructions:     A biopsy is the removal of a small piece of tissue for microscopic examination or testing. Healthy tissue can be obtained for the purpose of tissue-type matching for transplants. Unhealthy tissues are more commonly biopsied to diagnose disease. .   Liver Biopsy: This test helps detect cancer, infections, and the cause of an enlargement of the liver or elevated liver enzymes. It also helps to diagnose a number of liver diseases. The pain associated with the procedure may be felt in the shoulder. The risks include internal bleeding, pneumothorax, and injury to the surrounding organs. General Biopsy:     A mass can grow in any area of the body, and we are taking a specimen as ordered by your doctor. The risks are the same. They include bleeding, pain, and infection. Home Care Instructions: You may resume your regular diet and medication regimen. Do not drink alcohol, drive, or make any important legal decisions in the next 24 hours. Do not lift anything heavier than a gallon of milk until the soreness goes away. You may use over the counter acetaminophen or ibuprofen for the soreness. You may apply an ice pack to the affected area for 20-30 minutes at time for the first 24 hours. After that, you may apply a heat pack. Call If: You should call your Physician and/or the Radiology Nurse if you have any questions or concerns about the biopsy site. Call if you should have increased pain, fever, redness, drainage, or bleeding more than a small spot on the bandage. Follow-Up Instructions: Please see your ordering doctor as he/she has requested.     To Reach Us:    [phone]    Patient Signature:  Date: 10/5/2017  Discharging Nurse: Mary Coe RN

## 2017-10-05 NOTE — IP AVS SNAPSHOT
[address] [facility] [address]. 
[phone] Patient: Mirian Decker MRN: [id number] ICQ:[id number] Current Discharge Medication List  
  
ASK your doctor about these medications Dose & Instructions Dispensing Information Comments Morning Noon Evening Bedtime B COMPLEX 100 PO Your last dose was: Your next dose is: Take  by mouth daily. Refills:  0  
     
   
   
   
  
 CARDURA 8 mg tablet Generic drug:  doxazosin Your last dose was: Your next dose is: Take 1 Tab by mouth nightly. Quantity:  90 Tab Refills:  4  
     
   
   
   
  
 collagenase 250 unit/gram ointment Commonly known as:  SANTYL Your last dose was: Your next dose is:    
   
   
 Apply  to affected area daily. Quantity:  15 g Refills:  1 Glucosamine &Chondroit-MV-Min3 042-768-11-0.5 mg Tab Your last dose was: Your next dose is: Take  by mouth daily. Refills:  0  
     
   
   
   
  
 metoprolol succinate 100 mg tablet Commonly known as:  TOPROL-XL Your last dose was: Your next dose is: TAKE ONE TABLET BY MOUTH 2 TIMES A DAY Quantity:  180 Tab Refills:  4  
     
   
   
   
  
 omeprazole 20 mg capsule Commonly known as:  PRILOSEC Your last dose was: Your next dose is: TAKE 1 CAPSULE NIGHTLY Quantity:  90 Cap Refills:  4 RAPAFLO 8 mg capsule Generic drug:  silodosin Your last dose was: Your next dose is:    
   
   
 Dose:  8 mg Take 8 mg by mouth daily (with breakfast). Refills:  0  
     
   
   
   
  
 VITAMIN D3 1,000 unit Cap Generic drug:  cholecalciferol Your last dose was: Your next dose is: Take  by mouth daily. Refills:  0  
     
   
   
   
  
 warfarin 5 mg tablet Commonly known as:  COUMADIN Your last dose was: Your next dose is: Take 1 Tab by mouth nightly. Indications: CEREBRAL THROMBOEMBOLISM PREVENTION Quantity:  90 Tab Refills:  4

## 2017-10-05 NOTE — H&P
Radiology History and Physical    Patient: Lester Fitzgerald 64 y.o. male       Chief Complaint: No chief complaint on file. History of Present Illness: liver lesions     History:    Past Medical History:   Diagnosis Date    Arthritis     Atrial fibrillation ([facility] [address] 1994    Cancer ([facility] [address] 2008    SKIN X5, squamous    Cervical disc disease     GERD (gastroesophageal reflux disease)     History of prostate biopsy 2006, 2010    Dr Alejandra Fountain Hypertension     Presbycusis     RBBB (right bundle branch block)     TIA (transient ischemic attack) 1994     Family History   Problem Relation Age of Onset    Stroke Father     Hypertension Father     Cancer Father      SKIN CANCER WITH METS    Arrhythmia Sister     Arrhythmia Brother     Coronary Artery Disease Brother     Stroke Brother     Cancer Brother      LEUKEMIA    MS Brother     Stroke Mother     Alzheimer Mother     Anesth Problems Neg Hx      Social History     Social History    Marital status:      Spouse name: N/A    Number of children: 2    Years of education: N/A     Occupational History     Retired     Social History Main Topics    Smoking status: Former Smoker     Packs/day: 1.00     Years: 10.00     Types: Pipe, Cigars     Quit date: 3/9/1968    Smokeless tobacco: Never Used      Comment: SMOKED CIGARS AND PIPE ALSO    Alcohol use 12.0 oz/week     10 Glasses of wine, 14 Standard drinks or equivalent per week    Drug use: No    Sexual activity: Not on file     Other Topics Concern     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 Problems after ice cream, some cheese    Pradaxa [Dabigatran Etexilate] Other (comments)     Affected vision       Current Medications:  Current Outpatient Prescriptions   Medication Sig    collagenase (SANTYL) 250 unit/gram ointment Apply  to affected area daily.  metoprolol succinate (TOPROL-XL) 100 mg tablet TAKE ONE TABLET BY MOUTH 2 TIMES A DAY    omeprazole (PRILOSEC) 20 mg capsule TAKE 1 CAPSULE NIGHTLY    CARDURA 8 mg tablet Take 1 Tab by mouth nightly.  silodosin (RAPAFLO) 8 mg capsule Take 8 mg by mouth daily (with breakfast).  Glucosamine &Chondroit-MV-Min3 979-386-02-0.5 mg tab Take  by mouth daily.  VITAMIN B COMPLEX/FOLIC ACID (B COMPLEX 184 PO) Take  by mouth daily.  Cholecalciferol, Vitamin D3, (VITAMIN D3) 1,000 unit cap Take  by mouth daily.  warfarin (COUMADIN) 5 mg tablet Take 1 Tab by mouth nightly. Indications: CEREBRAL THROMBOEMBOLISM PREVENTION (Patient taking differently: Take 1 Tab by mouth nightly.: except Friday)     No current facility-administered medications for this encounter. Physical Exam:  Blood pressure 138/90, pulse 93, temperature 98.1 °F (36.7 °C), resp. rate 16, height 5' 10\" (1.778 m), weight 79.4 kg (175 lb), SpO2 97 %. LUNG: clear to auscultation bilaterally, HEART: regular rate and rhythm, S1, S2 normal, no murmur, click, rub or gallop      Alerts:    Hospital Problems  Date Reviewed: 9/22/2017    None          Laboratory:      Recent Labs      10/05/17   0922   INR  1.0         Plan of Care/Planned Procedure:  Risks, benefits, and alternatives reviewed with patient and he agrees to proceed with the procedure.      Plan is for US guided liver lesion biopsy       Theodore Gomez MD

## 2017-10-10 NOTE — ED TRIAGE NOTES
Pt arrived ambulatory to ED with c/o right sided abd/flank pain since having a liver biopsy done last Thursday. Pt states he has not been able to sleep since and also reports having a headache and nausea but denies diarrhea/vomiting/fever/chills. Pt in no acute distress. VSS.

## 2017-10-10 NOTE — DISCHARGE INSTRUCTIONS
Abdominal Pain: Care Instructions  Your Care Instructions    Abdominal pain has many possible causes. Some aren't serious and get better on their own in a few days. Others need more testing and treatment. If your pain continues or gets worse, you need to be rechecked and may need more tests to find out what is wrong. You may need surgery to correct the problem. Don't ignore new symptoms, such as fever, nausea and vomiting, urination problems, pain that gets worse, and dizziness. These may be signs of a more serious problem. Your doctor may have recommended a follow-up visit in the next 8 to 12 hours. If you are not getting better, you may need more tests or treatment. The doctor has checked you carefully, but problems can develop later. If you notice any problems or new symptoms, get medical treatment right away. Follow-up care is a key part of your treatment and safety. Be sure to make and go to all appointments, and call your doctor if you are having problems. It's also a good idea to know your test results and keep a list of the medicines you take. How can you care for yourself at home? · Rest until you feel better. · To prevent dehydration, drink plenty of fluids, enough so that your urine is light yellow or clear like water. Choose water and other caffeine-free clear liquids until you feel better. If you have kidney, heart, or liver disease and have to limit fluids, talk with your doctor before you increase the amount of fluids you drink. · If your stomach is upset, eat mild foods, such as rice, dry toast or crackers, bananas, and applesauce. Try eating several small meals instead of two or three large ones. · Wait until 48 hours after all symptoms have gone away before you have spicy foods, alcohol, and drinks that contain caffeine. · Do not eat foods that are high in fat. · Avoid anti-inflammatory medicines such as aspirin, ibuprofen (Advil, Motrin), and naproxen (Aleve).  These can cause stomach upset. Talk to your doctor if you take daily aspirin for another health problem. When should you call for help? Call 911 anytime you think you may need emergency care. For example, call if:  · You passed out (lost consciousness). · You pass maroon or very bloody stools. · You vomit blood or what looks like coffee grounds. · You have new, severe belly pain. Call your doctor now or seek immediate medical care if:  · Your pain gets worse, especially if it becomes focused in one area of your belly. · You have a new or higher fever. · Your stools are black and look like tar, or they have streaks of blood. · You have unexpected vaginal bleeding. · You have symptoms of a urinary tract infection. These may include:  ¨ Pain when you urinate. ¨ Urinating more often than usual.  ¨ Blood in your urine. · You are dizzy or lightheaded, or you feel like you may faint. Watch closely for changes in your health, and be sure to contact your doctor if:  · You are not getting better after 1 day (24 hours). Where can you learn more? Go to http://lanceContourrafy.info/. Enter Z990 in the search box to learn more about \"Abdominal Pain: Care Instructions. \"  Current as of: March 20, 2017  Content Version: 11.3  © 4110-1435 [employer]. Care instructions adapted under license by [employer] (which disclaims liability or warranty for this information). If you have questions about a medical condition or this instruction, always ask your healthcare professional. [place] [address] any warranty or liability for your use of this information. Headache: Care Instructions  Your Care Instructions    Headaches have many possible causes. Most headaches aren't a sign of a more serious problem, and they will get better on their own. Home treatment may help you feel better faster. The doctor has checked you carefully, but problems can develop later.  If you notice any problems or new symptoms, get medical treatment right away. Follow-up care is a key part of your treatment and safety. Be sure to make and go to all appointments, and call your doctor if you are having problems. It's also a good idea to know your test results and keep a list of the medicines you take. How can you care for yourself at home? · Do not drive if you have taken a prescription pain medicine. · Rest in a quiet, dark room until your headache is gone. Close your eyes and try to relax or go to sleep. Don't watch TV or read. · Put a cold, moist cloth or cold pack on the painful area for 10 to 20 minutes at a time. Put a thin cloth between the cold pack and your skin. · Use a warm, moist towel or a heating pad set on low to relax tight shoulder and neck muscles. · Have someone gently massage your neck and shoulders. · Take pain medicines exactly as directed. ¨ If the doctor gave you a prescription medicine for pain, take it as prescribed. ¨ If you are not taking a prescription pain medicine, ask your doctor if you can take an over-the-counter medicine. · Be careful not to take pain medicine more often than the instructions allow, because you may get worse or more frequent headaches when the medicine wears off. · Do not ignore new symptoms that occur with a headache, such as a fever, weakness or numbness, vision changes, or confusion. These may be signs of a more serious problem. To prevent headaches  · Keep a headache diary so you can figure out what triggers your headaches. Avoiding triggers may help you prevent headaches. Record when each headache began, how long it lasted, and what the pain was like (throbbing, aching, stabbing, or dull). Write down any other symptoms you had with the headache, such as nausea, flashing lights or dark spots, or sensitivity to bright light or loud noise. Note if the headache occurred near your period.  List anything that might have triggered the headache, such as certain foods (chocolate, cheese, wine) or odors, smoke, bright light, stress, or lack of sleep. · Find healthy ways to deal with stress. Headaches are most common during or right after stressful times. Take time to relax before and after you do something that has caused a headache in the past.  · Try to keep your muscles relaxed by keeping good posture. Check your jaw, face, neck, and shoulder muscles for tension, and try relaxing them. When sitting at a desk, change positions often, and stretch for 30 seconds each hour. · Get plenty of sleep and exercise. · Eat regularly and well. Long periods without food can trigger a headache. · Treat yourself to a massage. Some people find that regular massages are very helpful in relieving tension. · Limit caffeine by not drinking too much coffee, tea, or soda. But don't quit caffeine suddenly, because that can also give you headaches. · Reduce eyestrain from computers by blinking frequently and looking away from the computer screen every so often. Make sure you have proper eyewear and that your monitor is set up properly, about an arm's length away. · Seek help if you have depression or anxiety. Your headaches may be linked to these conditions. Treatment can both prevent headaches and help with symptoms of anxiety or depression. When should you call for help? Call 911 anytime you think you may need emergency care. For example, call if:  · You have signs of a stroke. These may include:  ¨ Sudden numbness, paralysis, or weakness in your face, arm, or leg, especially on only one side of your body. ¨ Sudden vision changes. ¨ Sudden trouble speaking. ¨ Sudden confusion or trouble understanding simple statements. ¨ Sudden problems with walking or balance. ¨ A sudden, severe headache that is different from past headaches. Call your doctor now or seek immediate medical care if:  · You have a new or worse headache. · Your headache gets much worse.   Where can you learn more?  Go to http://lance-rafy.info/. Enter M271 in the search box to learn more about \"Headache: Care Instructions. \"  Current as of: October 14, 2016  Content Version: 11.3  © 2683-7885 [employer]. Care instructions adapted under license by [employer] (which disclaims liability or warranty for this information). If you have questions about a medical condition or this instruction, always ask your healthcare professional. [address] any warranty or liability for your use of this information. Hiatal Hernia: Care Instructions  Your Care Instructions  A hiatal hernia occurs when part of the stomach bulges into the chest cavity. A hiatal hernia may allow stomach acid and juices to back up into the esophagus (acid reflux). This can cause a feeling of burning, warmth, heat, or pain behind the breastbone. This feeling may often occur after you eat, soon after you lie down, or when you bend forward, and it may come and go. You also may have a sour taste in your mouth. These symptoms are commonly known as heartburn or reflux. But not all hiatal hernias cause symptoms. Follow-up care is a key part of your treatment and safety. Be sure to make and go to all appointments, and call your doctor if you are having problems. Its also a good idea to know your test results and keep a list of the medicines you take. How can you care for yourself at home? · Take your medicines exactly as prescribed. Call your doctor if you think you are having a problem with your medicine. · Do not take aspirin or other nonsteroidal anti-inflammatory drugs (NSAIDs), such as ibuprofen (Advil, Motrin) or naproxen (Aleve), unless your doctor says it is okay. Ask your doctor what you can take for pain. · Your doctor may recommend over-the-counter medicine. For mild or occasional indigestion, antacids such as Tums, Gaviscon, Maalox, or Mylanta may help.  Your doctor also may recommend over-the-counter acid reducers, such as famotidine (Pepcid AC), cimetidine (Tagamet HB), ranitidine (Zantac 75 and Zantac 150), or omeprazole (Prilosec). Read and follow all instructions on the label. If you use these medicines often, talk with your doctor. · Change your eating habits. ¨ Its best to eat several small meals instead of two or three large meals. ¨ After you eat, wait 2 to 3 hours before you lie down. Late-night snacks arent a good idea. ¨ Chocolate, mint, and alcohol can make heartburn worse. They relax the valve between the esophagus and the stomach. ¨ Spicy foods, foods that have a lot of acid (like tomatoes and oranges), and coffee can make heartburn symptoms worse in some people. If your symptoms are worse after you eat a certain food, you may want to stop eating that food to see if your symptoms get better. · Do not smoke or chew tobacco.  · If you get heartburn at night, raise the head of your bed 6 to 8 inches by putting the frame on blocks or placing a foam wedge under the head of your mattress. (Adding extra pillows does not work.)  · Do not wear tight clothing around your middle. · Lose weight if you need to. Losing just 5 to 10 pounds can help. When should you call for help? Call 911 anytime you think you may need emergency care. For example, call if:  · You have symptoms of a heart attack such as:  ¨ Chest pain or pressure. ¨ Sweating. ¨ Shortness of breath. ¨ Nausea or vomiting. ¨ Pain that spreads from the chest to the neck, jaw, or one or both shoulders or arms. ¨ Dizziness or lightheadedness. ¨ A fast or uneven pulse. After calling 911, chew 1 adult-strength aspirin. Wait for an ambulance. Do not try to drive yourself. · You vomit blood or what looks like coffee grounds. · You pass maroon or very bloody stools. Call your doctor now or seek immediate medical care if:  · You have new or different belly pain.   · Your stools are black and tarlike or have streaks of blood. · Food seems to catch in your throat or chest.  Watch closely for changes in your health, and be sure to contact your doctor if:  · Your symptoms get worse. · Your symptoms have not improved after 2 days. Where can you learn more? Go to http://lance-rafy.info/. Enter G868 in the search box to learn more about \"Hiatal Hernia: Care Instructions. \"  Current as of: August 9, 2016  Content Version: 11.3  © 7830-6697 [email or web address]. Care instructions adapted under license by [employer] (which disclaims liability or warranty for this information). If you have questions about a medical condition or this instruction, always ask your healthcare professional. [address] any warranty or liability for your use of this information.

## 2017-10-10 NOTE — ED NOTES
Discharge instructions reviewed with the patient by the MD.  Patient wheeled out and discharged home with wife.

## 2017-10-10 NOTE — ED PROVIDER NOTES
[facility] [address]  EMERGENCY DEPARTMENT HISTORY AND PHYSICAL EXAM       Date of Service: 10/10/2017   Patient Name: Erma Martinez   YOB: 1940  Medical Record Number: [id number]    History of Presenting Illness     Chief Complaint   Patient presents with    Abdominal Pain     states has beeing having abd pain and nausea since having liver biopsy on Thursday    Headache        History Provided By:  patient    Additional History:   Erma Martinez is a 64 y.o. male with PMhx significant for TIA, HTN, Afib and CA who presents ambulatory to the ED with cc of gradual onset right sided abd pain with nausea since 10/5 s/p liver biopsy. Pt describes that his pain radiates into his back. He states his pain is exacerbated with eating and improves with certain positions. He adds that his pain intensity fluctuates from 5/10-8/10. He states that at first he thought he was having a muscle spasm. He reports additional sx of productive cough with clear phlegm. He reports his last BM was this morning. He denies hx of cholecystectomy. He denies any sx's of vomiting, fever, chills, dysuria, CP, SOB, blood in stool, leg swelling and pain. Social Hx: - Tobacco, + EtOH, - Illicit Drugs  FHx: HTN and MI, denying FHx of DM    There are no other complaints, changes or physical findings at this time.     Primary Care Provider: Miky Berg MD     Past History     Past Medical History:   Past Medical History:   Diagnosis Date    Arthritis     Atrial fibrillation ([facility] [address] 1994    Cancer ([facility] [address] 2008    SKIN X5, squamous    Cervical disc disease     GERD (gastroesophageal reflux disease)     History of prostate biopsy 2006, 2010    Dr Yury Manzano Hypertension     Presbycusis     RBBB (right bundle branch block)     TIA (transient ischemic attack) 1994        Past Surgical History:   Past Surgical History:   Procedure Laterality Date    HX HERNIA REPAIR Bilateral 2000    HX HERNIA REPAIR Bilateral 2014    HX MALIGNANT SKIN LESION EXCISION      Moh's L cheek    HX MOHS PROCEDURES Right 2001    HX PROSTATECTOMY  2007 2010    BIOPSY ONLY X2        Family History:   Family History   Problem Relation Age of Onset    Stroke Father     Hypertension Father     Cancer Father      SKIN CANCER WITH METS    Arrhythmia Sister     Arrhythmia Brother     Coronary Artery Disease Brother     Stroke Brother     Cancer Brother      LEUKEMIA    MS Brother     Stroke Mother     Alzheimer Mother     Anesth Problems Neg Hx         Social History:   Social History   Substance Use Topics    Smoking status: Former Smoker     Packs/day: 1.00     Years: 10.00     Types: Pipe, Cigars     Quit date: 3/9/1968    Smokeless tobacco: Never Used      Comment: SMOKED CIGARS AND PIPE ALSO    Alcohol use 12.0 oz/week     10 Glasses of wine, 14 Standard drinks or equivalent per week        Allergies: Allergies   Allergen Reactions    Lactose Other (comments)     Intolerance? Problems after ice cream, some cheese    Pradaxa [Dabigatran Etexilate] Other (comments)     Affected vision         Review of Systems   Review of Systems   Constitutional: Negative. Negative for chills and fever. HENT: Negative for congestion. Eyes: Negative for visual disturbance. Respiratory: Positive for cough. Negative for shortness of breath. Cardiovascular: Negative for chest pain and leg swelling. Gastrointestinal: Positive for abdominal pain and nausea. Negative for blood in stool and vomiting. Endocrine: Negative for heat intolerance. Genitourinary: Negative. Negative for dysuria. Musculoskeletal: Positive for back pain. Negative for arthralgias (leg pain). Skin: Negative. Allergic/Immunologic: Positive for food allergies. Neurological: Negative. Hematological: Does not bruise/bleed easily. Psychiatric/Behavioral: Negative. All other systems reviewed and are negative.         Physical Exam  Physical Exam   Constitutional: He is oriented to person, place, and time. He appears well-developed and well-nourished. He appears distressed (mild). HENT:   Head: Normocephalic and atraumatic. Eyes: EOM are normal. Pupils are equal, round, and reactive to light. Neck: Normal range of motion. Neck supple. Cardiovascular: Regular rhythm and normal heart sounds. Tachycardia present. Pulmonary/Chest: Effort normal and breath sounds normal. He has no wheezes. Lungs clear to auscultation   Abdominal: Soft. Bowel sounds are normal. There is tenderness in the right upper quadrant, right lower quadrant, epigastric area and left lower quadrant. Musculoskeletal: Normal range of motion. He exhibits no edema or tenderness. Right flank tenderness   Neurological: He is alert and oriented to person, place, and time. No cranial nerve deficit. Skin: Skin is warm and dry. No visible rashes. Psychiatric: He has a normal mood and affect. Nursing note and vitals reviewed. Medical Decision Making   I am the first provider for this patient. I reviewed the vital signs, available nursing notes, past medical history, past surgical history, family history and social history. Old Medical Records: Old medical records. Provider Notes:   DDx: post procedure complication, pancreatitis, dehydration, electrolyte abnormality, cholecystitis, UTI and kidney stone    ED Course:  10:41 AM  Initial assessment performed. The patients presenting problems have been discussed, and they are in agreement with the care plan formulated and outlined with them. I have encouraged them to ask questions as they arise throughout their visit. Progress Notes:   12:39 PM  Pt reports that he is feeling better. Pt's vitals are stable. /100 and HR from the 90s to 100s.      Diagnostic Study Results     Labs -      Recent Results (from the past 12 hour(s))   CBC WITH AUTOMATED DIFF    Collection Time: 10/10/17 10:32 AM   Result Value Ref Range    WBC 8.3 4.1 - 11.1 K/uL    RBC 4.90 4. 10 - 5.70 M/uL    HGB 15.8 12.1 - 17.0 g/dL    HCT 45.3 36.6 - 50.3 %    MCV 92.4 80.0 - 99.0 FL    MCH 32.2 26.0 - 34.0 PG    MCHC 34.9 30.0 - 36.5 g/dL    RDW 14.6 (H) 11.5 - 14.5 %    PLATELET 590 219 - 284 K/uL    NEUTROPHILS 69 32 - 75 %    LYMPHOCYTES 15 12 - 49 %    MONOCYTES 14 (H) 5 - 13 %    EOSINOPHILS 2 0 - 7 %    BASOPHILS 0 0 - 1 %    ABS. NEUTROPHILS 5.7 1.8 - 8.0 K/UL    ABS. LYMPHOCYTES 1.2 0.8 - 3.5 K/UL    ABS. MONOCYTES 1.2 (H) 0.0 - 1.0 K/UL    ABS. EOSINOPHILS 0.1 0.0 - 0.4 K/UL    ABS. BASOPHILS 0.0 0.0 - 0.1 K/UL   PROTHROMBIN TIME + INR    Collection Time: 10/10/17 10:32 AM   Result Value Ref Range    INR 1.3 (H) 0.9 - 1.1      Prothrombin time 13.0 (H) 9.0 - 92.7 sec   METABOLIC PANEL, COMPREHENSIVE    Collection Time: 10/10/17 10:32 AM   Result Value Ref Range    Sodium 134 (L) 136 - 145 mmol/L    Potassium 3.9 3.5 - 5.1 mmol/L    Chloride 101 97 - 108 mmol/L    CO2 23 21 - 32 mmol/L    Anion gap 10 5 - 15 mmol/L    Glucose 102 (H) 65 - 100 mg/dL    BUN 15 6 - 20 MG/DL    Creatinine 0.88 0.70 - 1.30 MG/DL    BUN/Creatinine ratio 17 12 - 20      GFR est AA >60 >60 ml/min/1.73m2    GFR est non-AA >60 >60 ml/min/1.73m2    Calcium 9.5 8.5 - 10.1 MG/DL    Bilirubin, total 2.0 (H) 0.2 - 1.0 MG/DL    ALT (SGPT) 168 (H) 12 - 78 U/L    AST (SGOT) 160 (H) 15 - 37 U/L    Alk.  phosphatase 262 (H) 45 - 117 U/L    Protein, total 7.3 6.4 - 8.2 g/dL    Albumin 3.1 (L) 3.5 - 5.0 g/dL    Globulin 4.2 (H) 2.0 - 4.0 g/dL    A-G Ratio 0.7 (L) 1.1 - 2.2     LIPASE    Collection Time: 10/10/17 10:32 AM   Result Value Ref Range    Lipase 221 73 - 393 U/L   MAGNESIUM    Collection Time: 10/10/17 10:32 AM   Result Value Ref Range    Magnesium 2.0 1.6 - 2.4 mg/dL   TROPONIN I    Collection Time: 10/10/17 10:32 AM   Result Value Ref Range    Troponin-I, Qt. <0.04 <0.05 ng/mL   CK    Collection Time: 10/10/17 10:32 AM   Result Value Ref Range  39 - 308 U/L   LACTIC ACID    Collection Time: 10/10/17 11:23 AM   Result Value Ref Range    Lactic acid 1.5 0.4 - 2.0 MMOL/L   EKG, 12 LEAD, INITIAL    Collection Time: 10/10/17 11:40 AM   Result Value Ref Range    Ventricular Rate 101 BPM    Atrial Rate 100 BPM    QRS Duration 134 ms    Q-T Interval 314 ms    QTC Calculation (Bezet) 407 ms    Calculated R Axis 74 degrees    Calculated T Axis -3 degrees    Diagnosis       Atrial fibrillation with rapid ventricular response  Right bundle branch block  No previous ECGs available     URINALYSIS W/ RFLX MICROSCOPIC    Collection Time: 10/10/17 11:46 AM   Result Value Ref Range    Color DARK YELLOW      Appearance CLOUDY (A) CLEAR      Specific gravity 1.023 1.003 - 1.030      pH (UA) 5.5 5.0 - 8.0      Protein TRACE (A) NEG mg/dL    Glucose NEGATIVE  NEG mg/dL    Ketone TRACE (A) NEG mg/dL    Blood NEGATIVE  NEG      Urobilinogen 1.0 0.2 - 1.0 EU/dL    Nitrites NEGATIVE  NEG      Leukocyte Esterase NEGATIVE  NEG      WBC 0-4 0 - 4 /hpf    RBC 0-5 0 - 5 /hpf    Epithelial cells FEW FEW /lpf    Bacteria NEGATIVE  NEG /hpf   BILIRUBIN, CONFIRM    Collection Time: 10/10/17 11:46 AM   Result Value Ref Range    Bilirubin UA, confirm NEGATIVE  NEG         Radiologic Studies -  The following have been ordered and reviewed:  CT Results  (Last 48 hours)               10/10/17 1210  CT ABD PELV W CONT Final result    Impression:  IMPRESSION:    1. No acute findings in the abdomen and pelvis. No significant change since   9/29/2017.   2. Probable sessile polyp in the left lateral wall of the rectum, not   demonstrated previously because of technical reasons. 3. Extensive hepatic metastatic disease. Unchanged retroperitoneal and jose   hepatis adenopathy. 4. Other findings as described above, unchanged. Narrative:  EXAM: CT ABDOMEN PELVIS WITH CONTRAST   INDICATION:  Abdominal pain and nausea since liver biopsy 5 days ago. COMPARISON: CT of 9/29/2017. CONTRAST: 100 mL of Isovue-370. TECHNIQUE:    Multislice helical CT was performed from the diaphragm to the symphysis pubis   during uneventful rapid bolus intravenous contrast administration. Oral contrast   was not administered. Contiguous 5 mm axial images were reconstructed and lung   and soft tissue windows were generated. Coronal and sagittal reformations were   generated. CT dose reduction was achieved through use of a standardized protocol   tailored for this examination and automatic exposure control for dose   modulation. FINDINGS:   LOWER CHEST: The visualized portions of the lung bases are clear. No pleural   fluid is demonstrated. There is cardiomegaly and coronary artery calcification. ABDOMEN:   Liver: Numerous low-attenuation lesions, suspicious for metastatic disease. No   evidence of liver laceration or subcapsular hematoma. Gallbladder and bile ducts: Mild gallbladder wall thickening, unchanged. No   biliary dilatation. Spleen: No abnormality. Pancreas: No abnormality. Adrenal glands: Unchanged subcentimeter right adrenal lesion likely representing   an adenoma. Kidneys: No focal abnormalities or hydronephrosis. Small right renal artery   aneurysm. PELVIS:   Reproductive organs:  Diffuse prostatic enlargement. Bladder: Mild diffuse bladder wall thickening. Bladder diverticulum. BOWEL AND MESENTERY: Large hiatus hernia. No small bowel abnormalities. 1.5 x   0.9 cm soft tissue density along the left lateral wall of the superior rectum,   suspicious for a sessile polyp. Not previously demonstrated due to the presence   of stool. Mild colonic diverticulosis. No evidence of acute diverticulitis. No   colonic obstruction. Appendix within normal limits. PERITONEUM/RETROPERITONEUM: No ascites or free intraperitoneal air. Aorta   atherosclerotic but not aneurysmal.. Unchanged adenopathy in the region of the   jose hepatis.  Unchanged mild retroperitoneal adenopathy, previously described   in detail. No pelvic mass or adenopathy. BONES AND SOFT TISSUES: Scoliosis and degenerative changes in the spine. CXR Results  (Last 48 hours)               10/10/17 1115  XR CHEST PORT Final result    Impression:  IMPRESSION: Minimal left basilar subsegmental atelectasis. Lung hyperinflation. Narrative:  EXAM:  XR CHEST PORT       INDICATION:  Abdominal pain and nausea since the liver biopsy on Thursday. COMPARISON:  9/28/2017       FINDINGS: A portable AP radiograph of the chest was obtained at 1101 hours. The   patient is on a cardiac monitor. There is a linear opacity at the left base. The lungs are hyperinflated. The cardiac and mediastinal contours and pulmonary   vascularity are stable. The bones and soft tissues are grossly within normal   limits. Vital Signs-Reviewed the patient's vital signs.    Patient Vitals for the past 12 hrs:   Temp Pulse Resp BP SpO2   10/10/17 1300 - (!) 101 22 (!) 152/107 96 %   10/10/17 1258 - (!) 101 23 (!) 155/114 96 %   10/10/17 1145 - (!) 101 24 (!) 155/102 96 %   10/10/17 1130 - (!) 116 25 (!) 162/103 95 %   10/10/17 1115 - (!) 103 23 (!) 141/95 95 %   10/10/17 1100 - (!) 103 21 169/87 96 %   10/10/17 1045 - (!) 105 23 (!) 161/97 96 %   10/10/17 1033 - (!) 107 20 (!) 177/121 96 %   10/10/17 1020 97.3 °F (36.3 °C) (!) 103 21 (!) 177/97 97 %   10/10/17 1017 - - - (!) 177/97 -       Medications Given in the ED:  Medications   morphine injection 4 mg (4 mg IntraVENous Refused 10/10/17 1053)   HYDROcodone-acetaminophen (NORCO) 5-325 mg per tablet 1 Tab (not administered)   ondansetron (ZOFRAN) injection 4 mg (4 mg IntraVENous Given 10/10/17 1120)   sodium chloride 0.9 % bolus infusion 1,000 mL (0 mL IntraVENous IV Completed 10/10/17 1221)   sodium chloride (NS) flush 10 mL (10 mL IntraVENous Given 10/10/17 1210)   iopamidol (ISOVUE-370) 76 % injection 100 mL (100 mL IntraVENous Given 10/10/17 1210)   0.9% sodium chloride infusion (0 mL/hr IntraVENous IV Completed 10/10/17 1257)       EKG interpretation: (Preliminary) 1140  Rhythm: atrial fib with RVR; and irregular. Rate (approx.): 101; Axis: normal; OR interval: normal; QRS interval: normal ; ST/T wave: normal; Other findings: right bundle branch block. Written by SOCO Hernandezibe, as dictated by Kristy Avilez MD    Diagnosis   Clinical Impression:   1. Abdominal pain, generalized    2. Tension headache    3. Liver metastases ([address]    4. Hiatal hernia    5. Rectal polyp    6. Essential hypertension         Plan:  1:   Follow-up Information     Follow up With Details Comments Contact Info    Ailyn Smith MD In 2 days As needed [facility] [address]  [phone]      [facility] EMERGENCY DEPT  If symptoms worsen [address] [place] [address]  [phone]        2:   Current Discharge Medication List      START taking these medications    Details   HYDROcodone-acetaminophen (NORCO) 5-325 mg per tablet Take 1 Tab by mouth every four (4) hours as needed for Pain. Max Daily Amount: 6 Tabs. Qty: 20 Tab, Refills: 0           Return to ED if Worse    Disposition Note:  Discharge Note:  1:27 PM  The patient is ready for discharge. The patient's signs, symptoms, diagnosis, and discharge instruction have been discussed and the patient has conveyed their understanding. The patient is to follow up as recommended or return to the ER should their symptoms worsen. Plan has been discussed and the patient is in agreement. Written by SOCO Hernandezibmegan, as dictated by Kristy Avilez MD.    _______________________________   Attestations: This is note is prepared by Sandy Martin, acting as Scribe for MD Krisyt Lake MD The scribe's documentation has been prepared under my direction and personally reviewed by me in its entirety.  I confirm that the note above accurately reflects all work, treatment, procedures, and medical decision making performed by me.   _______________________________

## 2017-10-13 PROBLEM — C78.7 LIVER METASTASIS (HCC): Status: ACTIVE | Noted: 2017-01-01

## 2017-10-13 NOTE — Clinical Note
Hi Dr. Meaghan Daly is still not definitive  He was quite upset about that and understandably so, likely upper GI primary- has been referred to GI for EGD EUS. He might seek an opinion at an academic center as well and I will help him with that.    Thanks - [employer]

## 2017-10-13 NOTE — PROGRESS NOTES
Hematology/Oncology Consult    REASON FOR CONSULT: Liver metastasis  REQUESTED BY: Dr. Pradeep Morin: Mr. Karlos Velasquez is a 64 y.o. male with Atrial fibrillation who is on Warfarin who presents for evaluation of newly found liver metastasis  A month ago he had mild RUQ pain which improved with pressure, but had since been coming intermittently. He also noted several other changes in his health. He lost energy which he had noted for about 2 months prior to the RUQ pain, had decreased appetite and lost about 9-10 lb since July 2017. He was Dr. Odalys Starkey who noted rising LFTs- Bb 1.2, Alk phos 151 on 9/22/17. CT abdomen on 9/29/17 showed multiple liver masses, largest right lateral liver measuring 8.7 x 6.4 cm. Additionally multiple prominent retroperitoneal nodes worrisome for metastatic disease. Largest jose hepatic lymph node measuring 1.9 x 3.1. Looked unchanged on 10/10/17 apart from a sessile rectal polyp noted this time as well. Biopsy on 10/5/17 showed metastatic adenocarcinoma- CK7, CK20, CDX-2 positive TTF-1 negative - upper GI tract or Pancreaticobiliary tract. Today he presents with his wife. This has been understandably rough and anxiety provoking. He notes ongoing since of RUQ discomfort without clear radiation and takes some Tylenol for this with modest relief. He notes constipation , no bleeding, no fevers, chills, no increase in abdominal girth, no dysuria. Has intermittent nausea and continues to have decreased appetite. Denies CP, SOB, Cough, HA, falls. Rashes.     Past Medical History:   Diagnosis Date    Arthritis     Atrial fibrillation ([facility] [address] 1994    Cancer ([facility] [address] 2008    SKIN X5, squamous    Cervical disc disease     GERD (gastroesophageal reflux disease)     History of prostate biopsy 2006, 2010    Dr Andreea Garner Hypertension     Presbycusis     RBBB (right bundle branch block)     TIA (transient ischemic attack) 1994       Past Surgical History:   Procedure Laterality Date    HX HERNIA REPAIR Bilateral 2000    HX HERNIA REPAIR Bilateral 2014    HX MALIGNANT SKIN LESION EXCISION      Moh's L cheek    HX MOHS PROCEDURES Right 2001    HX PROSTATECTOMY  2007 2010    BIOPSY ONLY X2       Allergies   Allergen Reactions    Lactose Other (comments)     Intolerance? Problems after ice cream, some cheese    Pradaxa [Dabigatran Etexilate] Other (comments)     Affected vision       Current Outpatient Prescriptions   Medication Sig Dispense Refill    HYDROcodone-acetaminophen (NORCO) 5-325 mg per tablet Take 1 Tab by mouth every four (4) hours as needed for Pain. Max Daily Amount: 6 Tabs. 20 Tab 0    collagenase (SANTYL) 250 unit/gram ointment Apply  to affected area daily. 15 g 1    warfarin (COUMADIN) 5 mg tablet Take 1 Tab by mouth nightly. Indications: CEREBRAL THROMBOEMBOLISM PREVENTION (Patient taking differently: Take 1 Tab by mouth nightly.: except Friday) 90 Tab 4    metoprolol succinate (TOPROL-XL) 100 mg tablet TAKE ONE TABLET BY MOUTH 2 TIMES A  Tab 4    omeprazole (PRILOSEC) 20 mg capsule TAKE 1 CAPSULE NIGHTLY 90 Cap 4    CARDURA 8 mg tablet Take 1 Tab by mouth nightly. 90 Tab 4    silodosin (RAPAFLO) 8 mg capsule Take 8 mg by mouth daily (with breakfast).  Glucosamine &Chondroit-MV-Min3 899-300-46-0.5 mg tab Take  by mouth daily.  VITAMIN B COMPLEX/FOLIC ACID (B COMPLEX 948 PO) Take  by mouth daily.  Cholecalciferol, Vitamin D3, (VITAMIN D3) 1,000 unit cap Take  by mouth daily.          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    Blood Transfusions No    Caffeine Concern No    Occupational Exposure No    Hobby Hazards No    Sleep Concern No    Stress Concern No    Weight Concern No    Special Diet Yes     Warfarin restriction    Back Care No    Exercise Yes     golf, [employer] 3 days a week    Bike Helmet No    Seat Belt Yes    Self-Exams Yes     Social History Narrative       Family History   Problem Relation Age of Onset    Stroke Father     Hypertension Father     Cancer Father      SKIN CANCER WITH METS    Arrhythmia Sister     Arrhythmia Brother     Coronary Artery Disease Brother     Stroke Brother     Cancer Brother      LEUKEMIA    MS Brother     Stroke Mother     Alzheimer Mother     Anesth Problems Neg Hx        ROS  A 12 point review of systems was obtained and is negative except as listed in HPI.   ECOG PS is 1    Physical Examination:   Visit Vitals    /84    Pulse 92    Temp 97.2 °F (36.2 °C)    Resp 16    Ht 5' 10\" (1.778 m)    Wt 173 lb 12.8 oz (78.8 kg)    SpO2 98%    BMI 24.94 kg/m2     General appearance - alert, well appearing, and in no distress  Mental status - oriented to person, place, and time  Mouth - mucous membranes moist, pharynx normal without lesions  Neck - supple, no significant adenopathy  Lymphatics - no palpable lymphadenopathy, no hepatosplenomegaly  Chest - clear to auscultation, no wheezes, rales or rhonchi, symmetric air entry  Heart - normal rate, regular rhythm, normal S1, S2, no murmurs, rubs, clicks or gallops  Abdomen - soft, nontender, nondistended, no masses or organomegaly, bowel sounds present  Back exam - full range of motion, no tenderness, palpable spasm or pain on motion  Neurological - normal speech, no focal findings or movement disorder noted  Musculoskeletal - no joint tenderness, deformity or swelling  Extremities - peripheral pulses normal, no pedal edema, no clubbing or cyanosis  Skin - normal coloration and turgor, no rashes, no suspicious skin lesions noted    LABS  Lab Results   Component Value Date/Time    WBC 8.3 10/10/2017 10:32 AM    HGB 15.8 10/10/2017 10:32 AM    HCT 45.3 10/10/2017 10:32 AM    PLATELET 219 07/73/2170 10:32 AM    MCV 92.4 10/10/2017 10:32 AM    ABS. NEUTROPHILS 5.7 10/10/2017 10:32 AM     Lab Results   Component Value Date/Time    Sodium 134 10/10/2017 10:32 AM    Potassium 3.9 10/10/2017 10:32 AM    Chloride 101 10/10/2017 10:32 AM    CO2 23 10/10/2017 10:32 AM    Glucose 102 10/10/2017 10:32 AM    BUN 15 10/10/2017 10:32 AM    Creatinine 0.88 10/10/2017 10:32 AM    GFR est AA >60 10/10/2017 10:32 AM    GFR est non-AA >60 10/10/2017 10:32 AM    Calcium 9.5 10/10/2017 10:32 AM     Lab Results   Component Value Date/Time    AST (SGOT) 160 10/10/2017 10:32 AM    Alk. phosphatase 262 10/10/2017 10:32 AM    Protein, total 7.3 10/10/2017 10:32 AM    Albumin 3.1 10/10/2017 10:32 AM    Globulin 4.2 10/10/2017 10:32 AM    A-G Ratio 0.7 10/10/2017 10:32 AM         PATHOLOGY     Liver mass, needle core biopsy:   Metastatic adenocarcinoma (See comment)   Addendum Comment   Immunohistochemical stains reveal the following staining pattern:   CK7: Diffuse strong cytoplasmic decoration of tumor cells   CK20: Diffuse strong cytoplasmic decoration of tumor cells   CDX-2: Diffuse strong nuclear decoration of tumor cells   TTF-1: Negative   PSA: Negative   PSAP: Negative   Positive and negative controls stains appropriately. These findings support a metastatic carcinoma, likely from the upper gastrointestinal tract, or pancreaticobiliary tract, and help exclude a metastasis from the prostate       IMAGING- CT Abdomen and pelvis 10/10/17       IMPRESSION  IMPRESSION:   1. No acute findings in the abdomen and pelvis.  No significant change since  9/29/2017.  2. Probable sessile polyp in the left lateral wall of the rectum, not  demonstrated previously because of technical reasons. 3. Extensive hepatic metastatic disease. Unchanged retroperitoneal and jose  hepatis adenopathy. 4. Other findings as described above, unchanged. ASSESSMENT  Mr. Fabby Lopez is a 64 y.o. male with metastatic adenocarcinoma to the liver and Abdominal LNs possibly of upper GI or pancreaticobiliary primary      PLAN  Metastatic adenocarcinoma  Available scans and biopsy results were reviewed  Definitive diagnosis not yet available and path suggests and upper GI vs pancreaticobiliary primary  CT however without a clear pancreatic mass- though this was not triple phase hence cannot rule that out completely      Reviewed this is unfortunately stage IV disease and not curable  Palliative chemotherapy or immunotherapy options dictated by molecular studies- HER2 and MSI added  CT chest to complete staging  EGD and EUS- Discussed with Dr. Amy Hahn with GI- requested earliest possible appointment      RUQ Pain  Secondary to subcapsular metastasis  Stop Tylenol  Start tramadol Q 6 hours prn    Constipation  Unclear etiology  Colonoscopy was < 10 years ago and path excludes a lower GI source  Senna+ Colace BID and Miralax PRN    Afib on Warfarin  Continue Warfarin till he has a date for EGD and EUS-     RTC 1week -10 days       Christy Bain MD        Mr. Fabby Lopez has a reminder for a \"due or due soon\" health maintenance. I have asked that he contact his primary care provider for follow-up on this health maintenance.

## 2017-10-13 NOTE — TELEPHONE ENCOUNTER
Call placed to patient, HIPAA verified. Patient updated on scheduled CT scan on 10/17 at 8am, NPO 4 hours prior, no Ibuprofen day of testing. Updated on scheduled appointment with Dr. Ara Machado on 10/23 at 145 pm at [facility] Location  [address] [place] [address]. [address] [id number]  #[phone]  WNR#[phone]    Patient to call and pre register prior to appointment, information provided to patient, Dr. Petey Orlando sent message to Dr. Ara Machado to see if sooner appointment can be made available. Patient verbalized understanding and thanked for call.

## 2017-10-16 PROBLEM — C77.9 REGIONAL LYMPH NODE METASTASIS PRESENT (HCC): Status: ACTIVE | Noted: 2017-01-01

## 2017-10-16 PROBLEM — R52 PAIN: Status: ACTIVE | Noted: 2017-01-01

## 2017-10-19 NOTE — TELEPHONE ENCOUNTER
Call to Dr Riya Sanderson office to confirm appointment. Initial GI appointment is scheduled today and patient has arrived. No confirmation of EGD appointment yet. Will obtain EGD report when complete.

## 2017-10-20 PROBLEM — C80.1 METASTATIC ADENOCARCINOMA INVOLVING RETROPERITONEUM WITH UNKNOWN PRIMARY SITE (HCC): Status: ACTIVE | Noted: 2017-01-01

## 2017-10-20 PROBLEM — C78.6 METASTATIC ADENOCARCINOMA INVOLVING RETROPERITONEUM WITH UNKNOWN PRIMARY SITE (HCC): Status: ACTIVE | Noted: 2017-01-01

## 2017-10-20 NOTE — PROGRESS NOTES
There  Is ACP information in this note:      Chief Complaint   Patient presents with    Anticoagulation    Weight Loss         HPI:      Roger Wilks is a 64 y.o. male. Retired . Long history of AF, anticoagulated with warfarin. Complains of nausea, weight loss and fatigue. Undergoing workup for adenocarcinoma (unknown primary at this time)-found in the liver by CT scan when he presented with right sided pain and elevated liver enzymes. Needle biopsy at [facility]: adenocarcinoma suggestive of upper GI source. Scheduled for MRCP today and for EGD with Dr Link Fofana 10/27/17. Allergies   Allergen Reactions    Lactose Other (comments)     Intolerance? Problems after ice cream, some cheese    Pradaxa [Dabigatran Etexilate] Other (comments)     Affected vision       Current Outpatient Prescriptions   Medication Sig    senna-docusate (PERICOLACE) 8.6-50 mg per tablet Take 2 Tabs by mouth two (2) times a day.  polyethylene glycol (MIRALAX) 17 gram packet Take 1 Packet by mouth daily.  prochlorperazine (COMPAZINE) 10 mg tablet Take 1 Tab by mouth every six (6) hours as needed for up to 7 days.  collagenase (SANTYL) 250 unit/gram ointment Apply  to affected area daily.  metoprolol succinate (TOPROL-XL) 100 mg tablet TAKE ONE TABLET BY MOUTH 2 TIMES A DAY    omeprazole (PRILOSEC) 20 mg capsule TAKE 1 CAPSULE NIGHTLY    CARDURA 8 mg tablet Take 1 Tab by mouth nightly.  silodosin (RAPAFLO) 8 mg capsule Take 8 mg by mouth daily (with breakfast).  Glucosamine &Chondroit-MV-Min3 810-052-58-0.5 mg tab Take  by mouth daily.  VITAMIN B COMPLEX/FOLIC ACID (B COMPLEX 163 PO) Take  by mouth daily.  Cholecalciferol, Vitamin D3, (VITAMIN D3) 1,000 unit cap Take  by mouth daily.  traMADol (ULTRAM) 50 mg tablet Take 1 Tab by mouth every six (6) hours as needed for Pain. Max Daily Amount: 200 mg.  warfarin (COUMADIN) 5 mg tablet Take 1 Tab by mouth nightly.  Indications: CEREBRAL THROMBOEMBOLISM PREVENTION (Patient taking differently: Take 1 Tab by mouth nightly.: except Friday)     No current facility-administered medications for this visit. Past Medical History:   Diagnosis Date    Arthritis     Atrial fibrillation ([facility] [address]     Cancer ([facility] [address]     SKIN X5, squamous    Cervical disc disease     GERD (gastroesophageal reflux disease)     History of prostate biopsy ,     Dr Liza Pang Hypertension     Presbycusis     RBBB (right bundle branch block)     TIA (transient ischemic attack)          ROS:  Denies fever, chills, cough, chest pain, SOB,  nausea, vomiting, or diarrhea. Denies wt loss, wt gain, hemoptysis, hematochezia or melena. Physical Examination:    BP 94/60 (BP 1 Location: Left arm, BP Patient Position: Sitting)  Pulse (!) 110  Resp 16  Ht 5' 10\" (1.778 m)  Wt 170 lb (77.1 kg)  SpO2 95%  BMI 24.39 kg/m2    General: Alert and Ox3, Fluent speech  HEENT:  NC/AT, EOMI, OP: clear  Neck:  Supple, no adenopathy, JVD, mass or bruit  Chest:  Clear to Ausculation, without wheezes, rales, rubs or ronchi  Cardiac: IRRR  Abdomen:  +BS, soft, nontender without palpable HSM  Extremities:  No cyanosis, clubbing or edema  Neurologic:  Ambulatory without assist, CN 2-12 grossly intact. Moves all extremities. Skin: no rash  Lymphadenopathy: no cervical or supraclavicular nodes    Wt Readings from Last 3 Encounters:   10/20/17 170 lb (77.1 kg)   10/13/17 173 lb 12.8 oz (78.8 kg)   10/10/17 171 lb 11.8 oz (77.9 kg)       ASSESSMENT AND PLAN:     1. Metastatic carcinoma with unknown primary:  Stains suggest UGI source. Prognosis is guarded. I have had a alexandria discussion with him today. He understands that any treatment would be purely palliative and would not result in a cure. He has an advanced directive and a Living will.   I have instructed him to discuss with his family and friends his end of life wishes,  arrangements and financial concerns. Understands the concept of DNR and desires this in the event of sudden decline. ACP discussion: 20 minutes  2. MRCP today  3. He is therapeutically anticoagulated. 4.  Discontinue warfarin 10/23/17 (last dose 10/23). Resume warfarin October 28.     Orders Placed This Encounter    COLLECTION CAPILLARY BLOOD SPECIMEN    AMB POC PT/INR       Damian Solorio MD, [place]

## 2017-10-20 NOTE — MR AVS SNAPSHOT
Visit Information Date & Time Provider Department Dept. Phone Encounter #  
 10/20/2017  9:45 AM Rod Singh MD [address] [place] [address] [id number] Your Appointments 10/31/2017 10:45 AM  
ESTABLISHED PATIENT with MD [address] ([facility]) Appt Note: 1 WK F/U  
 [address] [facility] [address] [address] [phone]  
  
   
 [address] [facility] [address] [place] [address],[address] [address] Upcoming Health Maintenance Date Due  
 MEDICARE YEARLY EXAM 5/4/2018 GLAUCOMA SCREENING Q2Y 11/14/2018 DTaP/Tdap/Td series (2 - Td) 7/5/2027 Allergies as of 10/20/2017  Review Complete On: 10/20/2017 By: Rod Singh MD  
  
 Severity Noted Reaction Type Reactions Lactose  08/18/2015    Other (comments) Intolerance? Problems after ice cream, some cheese Pradaxa [Dabigatran Etexilate]  07/22/2015    Other (comments) Affected vision Current Immunizations  Reviewed on 8/23/2017 Name Date Influenza High Dose Vaccine PF 10/21/2016 Influenza Vaccine 10/20/2015, 10/1/2014 Pneumococcal Conjugate (PCV-13) 7/22/2015 10:18 AM  
 Pneumococcal Vaccine (Unspecified Type) 7/22/2009 Td 5/22/2013 Varicella Virus Vaccine 7/22/2006 Not reviewed this visit You Were Diagnosed With   
  
 Codes Comments Chronic atrial fibrillation (HCC)    -  Primary ICD-10-CM: N42.5 ICD-9-CM: 427.31 Vitals BP Pulse Resp Height(growth percentile) Weight(growth percentile) SpO2  
 94/60 (BP 1 Location: Left arm, BP Patient Position: Sitting) (!) 110 16 5' 10\" (1.778 m) 170 lb (77.1 kg) 95% BMI Smoking Status 24.39 kg/m2 Former Smoker BMI and BSA Data Body Mass Index Body Surface Area  
 24.39 kg/m 2 1.95 m 2 Preferred Pharmacy Pharmacy Name Phone [facility] [phone] Your Updated Medication List  
  
   
 This list is accurate as of: 10/20/17 10:40 AM.  Always use your most recent med list.  
  
  
  
  
 B COMPLEX 100 PO Take  by mouth daily. CARDURA 8 mg tablet Generic drug:  doxazosin Take 1 Tab by mouth nightly. collagenase 250 unit/gram ointment Commonly known as:  SANTYL Apply  to affected area daily. Glucosamine &Chondroit-MV-Min3 742-151-32-0.5 mg Tab Take  by mouth daily. metoprolol succinate 100 mg tablet Commonly known as:  TOPROL-XL  
TAKE ONE TABLET BY MOUTH 2 TIMES A DAY  
  
 omeprazole 20 mg capsule Commonly known as:  PRILOSEC  
TAKE 1 CAPSULE NIGHTLY polyethylene glycol 17 gram packet Commonly known as:  Elidia Floor Take 1 Packet by mouth daily. prochlorperazine 10 mg tablet Commonly known as:  COMPAZINE Take 1 Tab by mouth every six (6) hours as needed for up to 7 days. RAPAFLO 8 mg capsule Generic drug:  silodosin Take 8 mg by mouth daily (with breakfast). senna-docusate 8.6-50 mg per tablet Commonly known as:  Laurie Lock Take 2 Tabs by mouth two (2) times a day. traMADol 50 mg tablet Commonly known as:  ULTRAM  
Take 1 Tab by mouth every six (6) hours as needed for Pain. Max Daily Amount: 200 mg. VITAMIN D3 1,000 unit Cap Generic drug:  cholecalciferol Take  by mouth daily. warfarin 5 mg tablet Commonly known as:  COUMADIN Take 1 Tab by mouth nightly. Indications: CEREBRAL THROMBOEMBOLISM PREVENTION We Performed the Following AMB POC PT/INR [84731 CPT(R)] COLLECTION CAPILLARY BLOOD SPECIMEN [97449 CPT(R)] To-Do List   
 10/20/2017 4:45 PM  
  Appointment with [facility] MRI 2 at [facility] MRI Department ([phone]) **NPO Nothing to eat or drink 4-6 hours prior to your exam.**  1. Please bring a list or a bag of your current medications to your appointment 2.  Please be sure to remove ALL hair clips, pins, extensions, etc., prior to arriving for your MRI procedure. 3. If you have any medical implants or devices, please bring associated medical card with you. 4. Bring any non [facility] films or CDs pertaining to the area being imaged with you on the day of appointment. 5. A written order with a valid diagnosis and Physicians signature is required for all scheduled tests. 6. Check in at registration 30min before your appointment time unless you were instructed to do otherwise. Patient Instructions If you have any questions regarding [employer], you may call [employer] support at [phone]. Introducing [facility] SERVICES! [employer] introduces [employer] patient portal. Now you can access parts of your medical record, email your doctor's office, and request medication refills online. 1. In your internet browser, go to [email or web address] 2. Click on the First Time User? Click Here link in the Sign In box. You will see the New Member Sign Up page. 3. Enter your [employer] Access Code exactly as it appears below. You will not need to use this code after youve completed the sign-up process. If you do not sign up before the expiration date, you must request a new code. · [employer] Access Code: [email or web address] Expires: 11/21/2017 11:22 AM 
 
4. Enter the last four digits of your Social Security Number (xxxx) and Date of Birth (mm/dd/yyyy) as indicated and click Submit. You will be taken to the next sign-up page. 5. Create a [employer] ID. This will be your [employer] login ID and cannot be changed, so think of one that is secure and easy to remember. 6. Create a [employer] password. You can change your password at any time. 7. Enter your Password Reset Question and Answer. This can be used at a later time if you forget your password. 8. Enter your e-mail address. You will receive e-mail notification when new information is available in [address] 9. Click Sign Up. You can now view and download portions of your medical record. 10. Click the Download Summary menu link to download a portable copy of your medical information. If you have questions, please visit the Frequently Asked Questions section of the [employer] website. Remember, [employer] is NOT to be used for urgent needs. For medical emergencies, dial 911. Now available from your iPhone and Android! Please provide this summary of care documentation to your next provider. Your primary care clinician is listed as Katrin Gaitan. If you have any questions after today's visit, please call [phone].

## 2017-10-20 NOTE — PATIENT INSTRUCTIONS
If you have any questions regarding [employer], you may call [employer] support at [phone].

## 2017-10-25 NOTE — TELEPHONE ENCOUNTER
Call to patient. HIPAA verified. Pt agrees to ECHO and RTC once results are received from EGD (scheduled 10/27)  He will wait for port placement and chemo processing until after RTC discussion. He is aware of guest house as available if needed in future. Will attempt to schedule both RTC and ECHO on November 3rd.

## 2017-10-27 NOTE — DISCHARGE INSTRUCTIONS
[address]  [facility], [address] [address] Sandra Rahman  [phone]  1940    Discomfort:  Sore throat- warm salt water gargle  redness at IV site- apply warm compress to area; if redness or soreness persist- contact your physician  Gaseous discomfort- walking, belching will help relieve any discomfort  You may not operate a vehicle for 12 hours  You may not engage in an occupation involving machinery or appliances for rest of today  You may not drink alcoholic beverages for at least 12 hours  Avoid making any critical decisions for at least 24 hour  DIET  You may resume your regular diet - however -  remember your colon is empty and a heavy meal will produce gas. Avoid these foods:  vegetables, fried / greasy foods, carbonated drinks    ACTIVITY  You may resume your normal daily activities   Spend the remainder of the day resting -  avoid any strenuous activity. CALL M.D. ANY SIGN OF   Increasing pain, nausea, vomiting  Abdominal distension (swelling)  New increased bleeding (oral or rectal)  Fever (chills)  Pain in chest area    Shortness of breath    Follow-up Instructions:   Call Dr. Lavell Huerta for any questions or problems. Telephone # [phone]    ENDOSCOPY FINDINGS:   Your endoscopy showed a mass at the junction of the esophagus and stomach. Biopsies were taken. This is likely to be the primary site of the cancer. We will inform Dr. Isabelle Serra per your request.    Signed By: Brendan Rosas.  Ric Cordova MD     10/27/2017  3:47 PM

## 2017-10-27 NOTE — H&P
[address] [facility] [address] [place] [address], [address] [facility]      History and Physical       NAME:  Carley Rosado   :   1940   MRN:   [id number]             History of Present Illness:  Patient is a 64 y.o. who is seen for adenocarcinoma of unknown primary. MRI/MRCP shows no evidence of pancreas mass. There is suspicion of a gastric mass. PMH:  Past Medical History:   Diagnosis Date    Arthritis     Atrial fibrillation ([address]     Cancer ([facility] 2008    SKIN X5, squamous    Cervical disc disease     GERD (gastroesophageal reflux disease)     History of prostate biopsy ,     Dr Linda Rosas Hypertension     Presbycusis     RBBB (right bundle branch block)     TIA (transient ischemic attack)        PSH:  Past Surgical History:   Procedure Laterality Date    HX HERNIA REPAIR Bilateral     HX HERNIA REPAIR Bilateral     HX MALIGNANT SKIN LESION EXCISION      Moh's L cheek    HX MOHS PROCEDURES Right 2001    HX PROSTATECTOMY  2007    BIOPSY ONLY X2       Allergies: Allergies   Allergen Reactions    Lactose Other (comments)     Intolerance? Problems after ice cream, some cheese    Pradaxa [Dabigatran Etexilate] Other (comments)     Affected vision       Home Medications:  Prior to Admission Medications   Prescriptions Last Dose Informant Patient Reported? Taking? CARDURA 8 mg tablet 10/26/2017 at Unknown time  No Yes   Sig: Take 1 Tab by mouth nightly. Cholecalciferol, Vitamin D3, (VITAMIN D3) 1,000 unit cap 10/26/2017 at Unknown time  Yes Yes   Sig: Take  by mouth daily. Glucosamine &Chondroit-MV-Min3 086-417-97-0.5 mg tab 10/26/2017 at Unknown time  Yes Yes   Sig: Take  by mouth daily. VITAMIN B COMPLEX/FOLIC ACID (B COMPLEX 844 PO) 10/25/2017  Yes No   Sig: Take  by mouth daily. collagenase (SANTYL) 250 unit/gram ointment 10/27/2017 at Unknown time  No Yes   Sig: Apply  to affected area daily.    metoprolol succinate (TOPROL-XL) 100 mg tablet 10/27/2017 at Unknown time  No Yes   Sig: TAKE ONE TABLET BY MOUTH 2 TIMES A DAY   omeprazole (PRILOSEC) 20 mg capsule 10/26/2017 at Unknown time  No Yes   Sig: TAKE 1 CAPSULE NIGHTLY   polyethylene glycol (MIRALAX) 17 gram packet 10/25/2017  No No   Sig: Take 1 Packet by mouth daily. senna-docusate (PERICOLACE) 8.6-50 mg per tablet 10/25/2017  No No   Sig: Take 2 Tabs by mouth two (2) times a day. silodosin (RAPAFLO) 8 mg capsule 10/26/2017 at Unknown time  Yes Yes   Sig: Take 8 mg by mouth daily (with breakfast). traMADol (ULTRAM) 50 mg tablet 10/27/2017 at Unknown time  No Yes   Sig: Take 1 Tab by mouth every six (6) hours as needed for Pain. Max Daily Amount: 200 mg.   warfarin (COUMADIN) 5 mg tablet 10/23/2017 at 2100  No No   Sig: Take 1 Tab by mouth nightly. Indications: CEREBRAL THROMBOEMBOLISM PREVENTION   Patient taking differently: Take 1 Tab by mouth nightly.: except Friday      Facility-Administered Medications: None       Hospital Medications:  No current facility-administered medications for this encounter.       Facility-Administered Medications Ordered in Other Encounters   Medication Dose Route Frequency    0.9% sodium chloride infusion   IntraVENous CONTINUOUS       Social History:  Social History   Substance Use Topics    Smoking status: Former Smoker     Packs/day: 1.00     Years: 10.00     Types: Pipe, Cigars     Quit date: 3/9/1968    Smokeless tobacco: Never Used      Comment: SMOKED CIGARS AND PIPE ALSO    Alcohol use 12.0 oz/week     10 Glasses of wine, 14 Standard drinks or equivalent per week       Family History:  Family History   Problem Relation Age of Onset    Stroke Father     Hypertension Father     Cancer Father      SKIN CANCER WITH METS    Arrhythmia Sister     Arrhythmia Brother     Coronary Artery Disease Brother     Stroke Brother     Cancer Brother      LEUKEMIA    MS Brother     Stroke Mother     Alzheimer Mother     Anesth Problems Neg Hx Review of Systems:      Constitutional: negative fever, negative chills, negative weight loss  Eyes:   negative visual changes  ENT:   negative sore throat, tongue or lip swelling  Respiratory:  negative cough, negative dyspnea  Cards:  negative for chest pain, palpitations, lower extremity edema  GI:   See HPI  :  negative for frequency, dysuria  Integument:  negative for rash and pruritus  Heme:  negative for easy bruising and gum/nose bleeding  Musculoskel: negative for myalgias,  back pain and muscle weakness  Neuro: negative for headaches, dizziness, vertigo  Psych:  negative for feelings of anxiety, depression       Objective:   Patient Vitals for the past 8 hrs:   BP Temp Pulse Resp SpO2 Height Weight   10/27/17 1450 128/84 97.5 °F (36.4 °C) (!) 116 15 99 % 5' 10\" (1.778 m) 74.6 kg (164 lb 9 oz)             EXAM:     NEURO-a&o   HEENT-wnl   LUNGS-clear    COR-regular rate and rhythym     ABD-soft , no tenderness, no rebound, good bs     EXT-no edema     Data Review     No results for input(s): WBC, HGB, HCT, PLT, HGBEXT, HCTEXT, PLTEXT in the last 72 hours. No results for input(s): NA, K, CL, CO2, BUN, CREA, GLU, PHOS, CA in the last 72 hours. No results for input(s): SGOT, GPT, AP, TBIL, TP, ALB, GLOB, GGT, AML, LPSE in the last 72 hours. No lab exists for component: AMYP, HLPSE  No results for input(s): INR, PTP, APTT in the last 72 hours.     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

## 2017-10-27 NOTE — ROUTINE PROCESS
Erma Martinez  1940  [id number]    Situation:  Verbal report received from: Curry Jewell RN  Procedure: Procedure(s):  ESOPHAGOGASTRODUODENOSCOPY (EGD)  ESOPHAGOGASTRODUODENAL (EGD) BIOPSY    Background:    Preoperative diagnosis: Abdominal pain  Postoperative diagnosis: 1.- Mass at [facility]     :  Dr. Cha Yates  Assistant(s): Endoscopy Technician-1: Emory Morgan  Endoscopy RN-1: Ryan Pascal RN    Specimens:   ID Type Source Tests Collected by Time Destination   1 : Mass at [address] [place] [address] Drive. Cha Yates MD 10/27/2017 1533 Pathology     H. Pylori  no    Assessment:  Intra-procedure medications   Anesthesia gave intra-procedure sedation and medications, see anesthesia flow sheet yes    Intravenous fluids: NS@ KVO     Vital signs stable     Abdominal assessment: round and soft     Recommendation:  Discharge patient per MD order.     Family or Friend   Permission to share finding with family or friend yes

## 2017-10-27 NOTE — PROCEDURES
[address] [place] [address], [address] (EGD) Procedure Note    Carley Rosado  1940  [id number]      Procedure: Endoscopic Gastroduodenoscopy --diagnostic, with biopsy    Indication: adenocarcinoma of unknown primary    Pre-operative Diagnosis: see indication above    Post-operative Diagnosis: see findings below    : Symone Stephens. Dayron Maldonado MD    Referring Provider: Cj Torres MD,  Saul Menjivar MD      Anesthesia/Sedation:  MAC anesthesia Propofol        Procedure Details     After informed consent was obtained for the procedure, with all risks and benefits of procedure explained the patient was taken to the endoscopy suite and placed in the left lateral decubitus position. Following sequential administration of sedation as per above, the endoscope was inserted into the mouth and advanced under direct vision to second portion of the duodenum. A careful inspection was made as the gastroscope was withdrawn, including a retroflexed view of the proximal stomach; findings and interventions are described below. Findings:   Esophagus: in the distal esophagus at 37 cm, the proximal margin of a malignant-appearing mass was seen. This was ulcerated and friable. The mass extends for 3 cm across the GE junction into a hiatal hernia, which is approximately 4 cm in length. The mass occupies approximately 50 percent of the lumen. Multiple cold biopsies were taken. Stomach: hiatal hernia and friable mass seen on retroflexion with mass extending across the GE junction  Duodenum: normal to second portion      Therapies: as above  Specimens: 1. GE junction mass         EBL: None      Complications:   None; patient tolerated the procedure well. Impression:    1. GE junction mass - biopsied. This is likely to be that patient's primary malignancy  2. Large hiatal hernia    Recommendations:  1. Follow up surgical pathology  2. Follow up with referring providers  3. No indication for esophageal stent placement at this time  4. EUS deferred given findings on EGD    Signed By: Sharif Esparza.  Tip Sarkar MD     10/27/2017  3:49 PM

## 2017-10-27 NOTE — IP AVS SNAPSHOT
[address] 
[phone] Patient: Luna Jaimes MRN: [id number] QNR:[id number] About your hospitalization You were admitted on:  October 27, 2017 You last received care in the:  [facility] ENDOSCOPY You were discharged on:  October 27, 2017 Why you were hospitalized Your primary diagnosis was:  Not on File Things You Need To Do (next 8 weeks) Tuesday Oct 31, 2017 ESTABLISHED PATIENT with Milton Aranda MD at 10:45 AM  
Where:  [address] ([facility]) Friday Nov 03, 2017 ECHO with ECHO LAB 1 [facility] at 10:30 AM (Arrive by 10:00 AM) Please be prepared to remove everything from the waist up and put on a gown. Where:  [facility] NON-INVASIVE CARD ([address]) Follow Up with Osiris Fleming MD at  2:00 PM  
Where: [address] [place] [address] Oncology at [facility]) Discharge Orders None A check srinivas indicates which time of day the medication should be taken. My Medications TAKE these medications as instructed Instructions Each Dose to Equal  
 Morning Noon Evening Bedtime B COMPLEX 100 PO Your last dose was: Your next dose is: Take  by mouth daily. CARDURA 8 mg tablet Generic drug:  doxazosin Your last dose was: Your next dose is: Take 1 Tab by mouth nightly. collagenase 250 unit/gram ointment Commonly known as:  SANTYL Your last dose was: Your next dose is:    
   
   
 Apply  to affected area daily. Glucosamine &Chondroit-MV-Min3 171-700-85-0.5 mg Tab Your last dose was: Your next dose is: Take  by mouth daily. metoprolol succinate 100 mg tablet Commonly known as:  TOPROL-XL Your last dose was: Your next dose is: TAKE ONE TABLET BY MOUTH 2 TIMES A DAY  
     
   
   
   
  
 omeprazole 20 mg capsule Commonly known as:  PRILOSEC Your last dose was: Your next dose is: TAKE 1 CAPSULE NIGHTLY polyethylene glycol 17 gram packet Commonly known as:  Lalita Sean Your last dose was: Your next dose is: Take 1 Packet by mouth daily. 17 g RAPAFLO 8 mg capsule Generic drug:  silodosin Your last dose was: Your next dose is: Take 8 mg by mouth daily (with breakfast). 8 mg  
    
   
   
   
  
 senna-docusate 8.6-50 mg per tablet Commonly known as:  Juanda Sangeeta Your last dose was: Your next dose is: Take 2 Tabs by mouth two (2) times a day. 2 Tab  
    
   
   
   
  
 traMADol 50 mg tablet Commonly known as:  ULTRAM  
   
Your last dose was: Your next dose is: Take 1 Tab by mouth every six (6) hours as needed for Pain. Max Daily Amount: 200 mg.  
 50 mg  
    
   
   
   
  
 VITAMIN D3 1,000 unit Cap Generic drug:  cholecalciferol Your last dose was: Your next dose is: Take  by mouth daily. warfarin 5 mg tablet Commonly known as:  COUMADIN Your last dose was: Your next dose is: Take 1 Tab by mouth nightly. Indications: CEREBRAL THROMBOEMBOLISM PREVENTION Discharge Instructions [address], [address] [place] [address] EGD DISCHARGE INSTRUCTIONS [place] 
[id number] 
1940 Discomfort: 
Sore throat- warm salt water gargle 
redness at IV site- apply warm compress to area; if redness or soreness persist- contact your physician Gaseous discomfort- walking, belching will help relieve any discomfort You may not operate a vehicle for 12 hours You may not engage in an occupation involving machinery or appliances for rest of today You may not drink alcoholic beverages for at least 12 hours Avoid making any critical decisions for at least 24 hour DIET You may resume your regular diet  however -  remember your colon is empty and a heavy meal will produce gas. Avoid these foods:  vegetables, fried / greasy foods, carbonated drinks ACTIVITY You may resume your normal daily activities Spend the remainder of the day resting -  avoid any strenuous activity. CALL M.D. ANY SIGN OF Increasing pain, nausea, vomiting Abdominal distension (swelling) New increased bleeding (oral or rectal) Fever (chills) Pain in chest area Shortness of breath Follow-up Instructions: 
 Call Dr. Steve Agrawal for any questions or problems. Telephone # [phone] ENDOSCOPY FINDINGS: 
 Your endoscopy showed a mass at the junction of the esophagus and stomach. Biopsies were taken. This is likely to be the primary site of the cancer. We will inform Dr. Galo Maldonado per your request. 
 
Signed By: Jose Muller. Marion Shelley MD   
 10/27/2017  3:47 PM 
  
 
 
[employer] Transitions of Care Introducing [employer] [address] offers a voluntary care coordination program to provide high quality service and care to [facility] fee-for-service beneficiaries. Toni Weinstein was designed to help you enhance your health and well-being through the following services: ? Transitions of Care  support for individuals who are transitioning from one care setting to another (example: Hospital to home). ? Chronic and Complex Care Coordination  support for individuals and caregivers of those with serious or chronic illnesses or with more than one chronic (ongoing) condition and those who take a number of different medications.   
 
 
If you meet specific medical criteria, a [facility] Coordinator may call you directly to coordinate your care with your primary care physician and your other care providers. For questions about the [facility] programs, please, contact your physicians office. For general questions or additional information about Accountable Care Organizations: 
Please visit www.medicare.gov/acos. html or call 1-800-MEDICARE (3-840.630.2411) TTY users should call 3-648.108.5168. Introducing [facility] & HEALTH SERVICES! Susie Wagner introduces [employer]ee patient portal. Now you can access parts of your medical record, email your doctor's office, and request medication refills online. 1. In your internet browser, go to [email or web address] 2. Click on the First Time User? Click Here link in the Sign In box. You will see the New Member Sign Up page. 3. Enter your [employer]ee[employer]ee Access Code exactly as it appears below. You will not need to use this code after youve completed the sign-up process. If you do not sign up before the expiration date, you must request a new code. · [employer] Access Code: [email or web address] Expires: 11/21/2017 11:22 AM 
 
4. Enter the last four digits of your Social Security Number (xxxx) and Date of Birth (mm/dd/yyyy) as indicated and click Submit. You will be taken to the next sign-up page. 5. Create a GeeYee ID. This will be your GeeYee login ID and cannot be changed, so think of one that is secure and easy to remember. 6. Create a [employer]ee password. You can change your password at any time. 7. Enter your Password Reset Question and Answer. This can be used at a later time if you forget your password. 8. Enter your e-mail address. You will receive e-mail notification when new information is available in [address] 9. Click Sign Up. You can now view and download portions of your medical record. 10. Click the Download Summary menu link to download a portable copy of your medical information. If you have questions, please visit the Frequently Asked Questions section of the MyChart website. Remember, MyChart is NOT to be used for urgent needs. For medical emergencies, dial 911. Now available from your iPhone and Android! Providers Seen During Your Hospitalization Provider Specialty Primary office phone Bean Mcrae MD Gastroenterology [phone] Your Primary Care Physician (PCP) Primary Care Physician Office Phone Office Fax Violetta Lugo [phone] You are allergic to the following Allergen Reactions Lactose Other (comments) Intolerance? Problems after ice cream, some cheese Pradaxa (Dabigatran Etexilate) Other (comments) Affected vision Recent Documentation Height Weight BMI Smoking Status 1.778 m 74.6 kg 23.61 kg/m2 Former Smoker Emergency Contacts Name Discharge Info Relation Home Work Mobile [address] [place] [address] CAREGIVER [3] Spouse [3] [phone] Patient Belongings The following personal items are in your possession at time of discharge: 
  Dental Appliances: None  Visual Aid: None Please provide this summary of care documentation to your next provider. Signatures-by signing, you are acknowledging that this After Visit Summary has been reviewed with you and you have received a copy. Patient Signature:  ____________________________________________________________ Date:  ____________________________________________________________  
  
Teri Paiz Provider Signature:  ____________________________________________________________ Date:  ____________________________________________________________

## 2017-10-27 NOTE — ANESTHESIA PREPROCEDURE EVALUATION
Anesthetic History   No history of anesthetic complications            Review of Systems / Medical History  Patient summary reviewed, nursing notes reviewed and pertinent labs reviewed    Pulmonary  Within defined limits                 Neuro/Psych   Within defined limits    CVA  TIA     Cardiovascular  Within defined limits  Hypertension        Dysrhythmias            GI/Hepatic/Renal  Within defined limits   GERD      Liver disease     Endo/Other  Within defined limits      Arthritis     Other Findings              Physical Exam    Airway  Mallampati: II  TM Distance: > 6 cm  Neck ROM: normal range of motion   Mouth opening: Normal     Cardiovascular  Regular rate and rhythm,  S1 and S2 normal,  no murmur, click, rub, or gallop             Dental  No notable dental hx       Pulmonary  Breath sounds clear to auscultation               Abdominal  GI exam deferred       Other Findings            Anesthetic Plan    ASA: 2  Anesthesia type: MAC          Induction: Intravenous  Anesthetic plan and risks discussed with: Patient

## 2017-10-30 NOTE — ANESTHESIA POSTPROCEDURE EVALUATION
Post-Anesthesia Evaluation and Assessment    Patient: Carley Rosado MRN: [id number]  SSN: [id number]    YOB: 1940  Age: 64 y.o. Sex: male       Cardiovascular Function/Vital Signs  Visit Vitals    /85    Pulse 91    Temp 36.7 °C (98.1 °F)    Resp 20    Ht 5' 10\" (1.778 m)    Wt 74.6 kg (164 lb 9 oz)    SpO2 98%    BMI 23.61 kg/m2       Patient is status post MAC anesthesia for Procedure(s):  ESOPHAGOGASTRODUODENOSCOPY (EGD)  ESOPHAGOGASTRODUODENAL (EGD) BIOPSY. Nausea/Vomiting: None    Postoperative hydration reviewed and adequate. Pain:  Pain Scale 1: Numeric (0 - 10) (10/27/17 1615)  Pain Intensity 1: 0 (10/27/17 1602)   Managed    Neurological Status: At baseline    Mental Status and Level of Consciousness: Arousable    Pulmonary Status:   O2 Device: Room air (10/27/17 1615)   Adequate oxygenation and airway patent    Complications related to anesthesia: None    Post-anesthesia assessment completed.  No concerns    Signed By: Sandra Castillo MD     October 30, 2017

## 2017-10-31 PROBLEM — C80.1 METASTATIC ADENOCARCINOMA INVOLVING RETROPERITONEUM WITH UNKNOWN PRIMARY SITE (HCC): Status: RESOLVED | Noted: 2017-01-01 | Resolved: 2017-01-01

## 2017-10-31 PROBLEM — C78.6 METASTATIC ADENOCARCINOMA INVOLVING RETROPERITONEUM WITH UNKNOWN PRIMARY SITE (HCC): Status: RESOLVED | Noted: 2017-01-01 | Resolved: 2017-01-01

## 2017-10-31 NOTE — MR AVS SNAPSHOT
Visit Information Date & Time Provider Department Dept. Phone Encounter #  
 10/31/2017 10:45 AM Milton Aranda MD Adriel Guzmán [id number] Your Appointments 11/3/2017  2:00 PM  
Follow Up with Osiris Fleming MD  
[facility] at [facility]) Appt Note: f/u- Liver CA (per Dr. Willard Dudley) [address] [address]  
[phone]  
  
   
 [address] [address] Upcoming Health Maintenance Date Due  
 MEDICARE YEARLY EXAM 5/4/2018 GLAUCOMA SCREENING Q2Y 11/14/2018 DTaP/Tdap/Td series (2 - Td) 7/5/2027 Allergies as of 10/31/2017  Review Complete On: 10/31/2017 By: Milton Aranda MD  
  
 Severity Noted Reaction Type Reactions Lactose  08/18/2015    Other (comments) Intolerance? Problems after ice cream, some cheese Pradaxa [Dabigatran Etexilate]  07/22/2015    Other (comments) Affected vision Current Immunizations  Reviewed on 8/23/2017 Name Date Influenza High Dose Vaccine PF 10/21/2016 Influenza Vaccine 10/20/2015, 10/1/2014 Pneumococcal Conjugate (PCV-13) 7/22/2015 10:18 AM  
 Pneumococcal Vaccine (Unspecified Type) 7/22/2009 Td 5/22/2013 Varicella Virus Vaccine 7/22/2006 Not reviewed this visit You Were Diagnosed With   
  
 Codes Comments Chronic atrial fibrillation (HCC)    -  Primary ICD-10-CM: E94.5 ICD-9-CM: 427.31 [facility]     ICD-10-CM: C78.7 ICD-9-CM: 197.7 Malignant neoplasm of lower third of esophagus (HCC)     ICD-10-CM: C15.5 ICD-9-CM: 150.5 Vitals BP Pulse Resp Height(growth percentile) Weight(growth percentile) SpO2  
 110/64 (BP 1 Location: Left arm, BP Patient Position: Sitting) (!) 112 20 5' 10\" (1.778 m) 168 lb (76.2 kg) 97% BMI Smoking Status 24.11 kg/m2 Former Smoker BMI and BSA Data Body Mass Index Body Surface Area 24.11 kg/m 2 1.94 m 2 Preferred Pharmacy Pharmacy Name Phone [facility]  [phone] Your Updated Medication List  
  
   
This list is accurate as of: 10/31/17 12:04 PM.  Always use your most recent med list.  
  
  
  
  
 B COMPLEX 100 PO Take  by mouth daily. CARDURA 8 mg tablet Generic drug:  doxazosin Take 1 Tab by mouth nightly. collagenase 250 unit/gram ointment Commonly known as:  SANTYL Apply  to affected area daily. Glucosamine &Chondroit-MV-Min3 566-564-93-0.5 mg Tab Take  by mouth daily. metoprolol succinate 100 mg tablet Commonly known as:  TOPROL-XL  
TAKE ONE TABLET BY MOUTH 2 TIMES A DAY  
  
 omeprazole 20 mg capsule Commonly known as:  PRILOSEC  
TAKE 1 CAPSULE NIGHTLY * polyethylene glycol 17 gram packet Commonly known as:  Twila Clause Take 1 Packet by mouth daily. * polyethylene glycol 17 gram/dose powder Commonly known as:  MIRALAX  
  
 RAPAFLO 8 mg capsule Generic drug:  silodosin Take 8 mg by mouth daily (with breakfast). senna-docusate 8.6-50 mg per tablet Commonly known as:  Ok [place] Take 2 Tabs by mouth two (2) times a day. traMADol 50 mg tablet Commonly known as:  ULTRAM  
Take 1 Tab by mouth every six (6) hours as needed for Pain. Max Daily Amount: 200 mg. VITAMIN D3 1,000 unit Cap Generic drug:  cholecalciferol Take  by mouth daily. warfarin 5 mg tablet Commonly known as:  COUMADIN Take 1 Tab by mouth nightly. Indications: CEREBRAL THROMBOEMBOLISM PREVENTION  
  
 * Notice: This list has 2 medication(s) that are the same as other medications prescribed for you. Read the directions carefully, and ask your doctor or other care provider to review them with you. To-Do List   
 11/03/2017 10:30 AM  
(Arrive by 10:00 AM) Appointment with ECHO LAB [facility] NON-INVASIVE CARD ([phone]) Please be prepared to remove everything from the waist up and put on a gown. Introducing [facility]! Kelley Robledo introduces [employer] patient portal. Now you can access parts of your medical record, email your doctor's office, and request medication refills online. 1. In your internet browser, go to [email or web address] 2. Click on the First Time User? Click Here link in the Sign In box. You will see the New Member Sign Up page. 3. Enter your [employer] Access Code exactly as it appears below. You will not need to use this code after youve completed the sign-up process. If you do not sign up before the expiration date, you must request a new code. · [employer] Access Code: [email or web address] Expires: 11/21/2017 11:22 AM 
 
4. Enter the last four digits of your Social Security Number (xxxx) and Date of Birth (mm/dd/yyyy) as indicated and click Submit. You will be taken to the next sign-up page. 5. Create a Innovid ID. This will be your Innov[employer] login ID and cannot be changed, so think of one that is secure and easy to remember. 6. Create a [employer] password. You can change your password at any time. 7. Enter your Password Reset Question and Answer. This can be used at a later time if you forget your password. 8. Enter your e-mail address. You will receive e-mail notification when new information is available in [address] 9. Click Sign Up. You can now view and download portions of your medical record. 10. Click the Download Summary menu link to download a portable copy of your medical information. If you have questions, please visit the Frequently Asked Questions section of the [employer] website. Remember, [employer] is NOT to be used for urgent needs. For medical emergencies, dial 911. Now available from your iPhone and Android! Please provide this summary of care documentation to your next provider. Your primary care clinician is listed as Natalie Grimes. If you have any questions after today's visit, please call [phone].

## 2017-10-31 NOTE — PROGRESS NOTES
Chief Complaint   Patient presents with    Fatigue         HPI:      Mr Epifanio May is a retired  who enjoys golf and spends most martines in [place]. He has been anticoagulated with Warfarin for AF for many years. During the latter part of August, he noted increasing lethargy, anorexia and weight loss along with right sided abdominal pain and elevated liver enzymes. Imaging determined multiple lesions in the liver and a percutaneous biopsy showed adenocarcinoma. Mac underwent EGD Oct 27 with Dr Cha Yates who found a large tumor in the GE junction at the margin of his hiatal hernia. He is under the care of Dr Brennen Baig (Oncology). Allergies   Allergen Reactions    Lactose Other (comments)     Intolerance? Problems after ice cream, some cheese    Pradaxa [Dabigatran Etexilate] Other (comments)     Affected vision       Current Outpatient Prescriptions   Medication Sig    polyethylene glycol (MIRALAX) 17 gram/dose powder     senna-docusate (PERICOLACE) 8.6-50 mg per tablet Take 2 Tabs by mouth two (2) times a day.  polyethylene glycol (MIRALAX) 17 gram packet Take 1 Packet by mouth daily.  collagenase (SANTYL) 250 unit/gram ointment Apply  to affected area daily.  metoprolol succinate (TOPROL-XL) 100 mg tablet TAKE ONE TABLET BY MOUTH 2 TIMES A DAY    omeprazole (PRILOSEC) 20 mg capsule TAKE 1 CAPSULE NIGHTLY    CARDURA 8 mg tablet Take 1 Tab by mouth nightly.  silodosin (RAPAFLO) 8 mg capsule Take 8 mg by mouth daily (with breakfast).  Glucosamine &Chondroit-MV-Min3 741-362-10-0.5 mg tab Take  by mouth daily.  VITAMIN B COMPLEX/FOLIC ACID (B COMPLEX 450 PO) Take  by mouth daily.  Cholecalciferol, Vitamin D3, (VITAMIN D3) 1,000 unit cap Take  by mouth daily.  traMADol (ULTRAM) 50 mg tablet Take 1 Tab by mouth every six (6) hours as needed for Pain. Max Daily Amount: 200 mg.  warfarin (COUMADIN) 5 mg tablet Take 1 Tab by mouth nightly.  Indications: CEREBRAL THROMBOEMBOLISM PREVENTION (Patient taking differently: Take 1 Tab by mouth nightly.: except Friday)     No current facility-administered medications for this visit. 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 Denies wt loss, wt gain, hemoptysis, hematochezia or melena. Physical Examination:    /64 (BP 1 Location: Left arm, BP Patient Position: Sitting)  Pulse (!) 112  Resp 20  Ht 5' 10\" (1.778 m)  Wt 168 lb (76.2 kg)  SpO2 97%  BMI 24.11 kg/m2    General: Alert and Ox3, Fluent speech  HEENT:  NC/AT, EOMI, OP: clear  Neck:  Supple, no adenopathy, JVD, mass or bruit  Chest:  Clear to Ausculation, without wheezes, rales, rubs or ronchi  Cardiac: IRRR  Abdomen:  +BS, soft, nontender without palpable HSM  Extremities:  No cyanosis, clubbing or edema  Neurologic:  Ambulatory without assist, CN 2-12 grossly intact. Moves all extremities. Skin: no rash  Lymphadenopathy: no cervical or supraclavicular nodes    Wt Readings from Last 3 Encounters:   10/31/17 168 lb (76.2 kg)   10/27/17 164 lb 9 oz (74.6 kg)   10/20/17 170 lb (77.1 kg)     Path report from EGD:     FINAL PATHOLOGIC DIAGNOSIS   Esophagus, GE junction, biopsy   Invasive adenocarcinoma, moderately to poorly differentiated *Electronically Signed Out By Bonita Wang M.D.*          A/P:  This represents metastatic carcinoma of the esophagus. His prognosis is guarded and the best he can hope for is palliation. Long discussion with Mr Yancy Davis today regarding his options. He elects Hospice.   I will cancel his echo and followup with  Catarino.  End of life discussion and plannin minutes. I will see him at his home. DMV Handicapped form and EMS DNR forms signed and given to the patient.     Orders Placed This Encounter    polyethylene glycol (MIRALAX) 17 gram/dose powder       Lorrie Ayon MD, [address]

## 2020-10-28 NOTE — PROGRESS NOTES
Chief Complaint   Patient presents with    Irregular Heart Beat    Anticoagulation         HPI:      Matteo Marion is a 64 y.o. male. There is a long history of AF. Anticoagulated with Warfarin 5 mg six days a week. He has felt tired for at least 6 weeks. No energy. Several spells of sharp RUQ pain lasting < 1 minute. No other GI sx: denies belching, nausea, vomiting, diarrhea or constipation). Still has his GB    Allergies   Allergen Reactions    Lactose Other (comments)     Intolerance? Problems after ice cream, some cheese    Pradaxa [Dabigatran Etexilate] Other (comments)     Affected vision       Current Outpatient Prescriptions   Medication Sig    collagenase (SANTYL) 250 unit/gram ointment Apply  to affected area daily.  warfarin (COUMADIN) 5 mg tablet Take 1 Tab by mouth nightly. Indications: CEREBRAL THROMBOEMBOLISM PREVENTION (Patient taking differently: Take 1 Tab by mouth nightly.: except Friday)    metoprolol succinate (TOPROL-XL) 100 mg tablet TAKE ONE TABLET BY MOUTH 2 TIMES A DAY    omeprazole (PRILOSEC) 20 mg capsule TAKE 1 CAPSULE NIGHTLY    CARDURA 8 mg tablet Take 1 Tab by mouth nightly.  silodosin (RAPAFLO) 8 mg capsule Take 8 mg by mouth daily (with breakfast).  Glucosamine &Chondroit-MV-Min3 042-017-68-0.5 mg tab Take  by mouth daily.  VITAMIN B COMPLEX/FOLIC ACID (B COMPLEX 265 PO) Take  by mouth daily.  Cholecalciferol, Vitamin D3, (VITAMIN D3) 1,000 unit cap Take  by mouth daily. No current facility-administered medications for this visit.         Past Medical History:   Diagnosis Date    Arthritis     [facility] ([facility] 1994    Cancer ([facility] 2008    SKIN X5, squamous    Cervical disc disease     GERD (gastroesophageal reflux disease)     History of prostate biopsy 2006, 2010    Dr Michael Nava Hypertension     Presbycusis     RBBB (right bundle branch block)     TIA (transient ischemic attack) 1994         ROS:  Denies fever, chills, cough, The types of intraocular lenses were reviewed with the patient along with a discussion of their various strengths and weaknesses. chest pain, SOB,  nausea, vomiting, or diarrhea. Denies wt loss, wt gain, hemoptysis, hematochezia or melena. Physical Examination:    /84 (BP 1 Location: Left arm, BP Patient Position: Sitting)  Pulse 74  Resp 16  Wt 181 lb (82.1 kg)  SpO2 96%  BMI 25.97 kg/m2    General: Alert and Ox3, Fluent speech  HEENT:  NC/AT, EOMI, OP: clear  Neck:  Supple, no adenopathy, JVD, mass or bruit  Chest:  Clear to Ausculation, without wheezes, rales, rubs or ronchi  Cardiac: IRRR  Abdomen:  +BS, soft, nontender without palpable HSM  Extremities:  No cyanosis, clubbing or edema  Neurologic:  Ambulatory without assist, CN 2-12 grossly intact. Moves all extremities. Skin: no rash  Lymphadenopathy: no cervical or supraclavicular nodes      ASSESSMENT AND PLAN:     1.  AF:  Therapeutically anticoagulated  2. Stop Digonxin. 3.  Anticipate a change in the INR as a result of stopping the DIgoxin  4. RUQ pain:  Checking CXR and RUQ US  5. RTC in October    Orders Placed This Encounter    COLLECTION CAPILLARY BLOOD SPECIMEN    US ABD LTD     Standing Status:   Future     Standing Expiration Date:   10/22/2018     Scheduling Instructions:      [address] [facility]     Order Specific Question:   Specific Body Part     Answer:   liver and GB    XR CHEST PA LAT     Standing Status:   Future     Standing Expiration Date:   10/22/2018     Order Specific Question:   Reason for Exam     Answer:   RUQ pain, Atrial fibrillation     Order Specific Question:   Is Patient Allergic to Contrast Dye? Answer:   Unknown     Order Specific Question:   Which facility to perform procedure?      Answer:   [facility]    CBC WITH AUTOMATED DIFF    METABOLIC PANEL, COMPREHENSIVE    AMB POC PT/INR       Shirley Terrell MD, [address]

## (undated) DEVICE — BAG SPEC BIOHZD LF 2MIL 6X10IN -- CONVERT TO ITEM 357326

## (undated) DEVICE — BLOCK BITE ENDO --

## (undated) DEVICE — NEEDLE HYPO 18GA L1.5IN PNK S STL HUB POLYPR SHLD REG BVL

## (undated) DEVICE — Device: Brand: SINGLE USE SOFT BRUSH

## (undated) DEVICE — NEONATAL-ADULT SPO2 SENSOR: Brand: NELLCOR

## (undated) DEVICE — BW-412T DISP COMBO CLEANING BRUSH: Brand: SINGLE USE COMBINATION CLEANING BRUSH

## (undated) DEVICE — SET EXTN TBNG L BOR 4 W STPCOCK ST 32IN PRIMING VOL 6ML

## (undated) DEVICE — FORCEPS BX L240CM JAW DIA2.8MM L CAP W/ NDL MIC MESH TOOTH

## (undated) DEVICE — SOLIDIFIER FLUID 3000 CC ABSORB

## (undated) DEVICE — KIT COMPLIANCE W ENDOGLDE + 11 NO BRSH ENDOKT

## (undated) DEVICE — CANN NASAL O2 CAPNOGRAPHY AD -- FILTERLINE

## (undated) DEVICE — CUFF BLD PRSS AD SM SZ 10 FOR 20-26CM LIMB VLY SFT W/O TUBE

## (undated) DEVICE — BAG BELONG PT PERS CLEAR HANDL

## (undated) DEVICE — CATH IV AUTOGRD BC BLU 22GA 25 -- INSYTE

## (undated) DEVICE — ENDO CARRY-ON PROCEDURE KIT INCLUDES ENZYMATIC SPONGE, GAUZE, BIOHAZARD LABEL, TRAY, LUBRICANT, DIRTY SCOPE LABEL, WATER LABEL, TRAY, DRAWSTRING PAD, AND DEFENDO 4-PIECE KIT.: Brand: ENDO CARRY-ON PROCEDURE KIT

## (undated) DEVICE — BW-400L DISP SNGL-END CLEANINGBRUSH: Brand: OLYMPUS

## (undated) DEVICE — CONTAINER SPEC 20 ML LID NEUT BUFF FORMALIN 10 % POLYPR STS

## (undated) DEVICE — KENDALL RADIOLUCENT FOAM MONITORING ELECTRODE -RECTANGULAR SHAPE: Brand: KENDALL

## (undated) DEVICE — KIT IV STRT W CHLORAPREP PD 1ML

## (undated) DEVICE — 1200 GUARD II KIT W/5MM TUBE W/O VAC TUBE: Brand: GUARDIAN

## (undated) DEVICE — SET ADMIN 16ML TBNG L100IN 2 Y INJ SITE IV PIGGY BK DISP

## (undated) DEVICE — SYRINGE MED 20ML STD CLR PLAS LUERLOCK TIP N CTRL DISP